# Patient Record
Sex: MALE | Race: WHITE | NOT HISPANIC OR LATINO | URBAN - METROPOLITAN AREA
[De-identification: names, ages, dates, MRNs, and addresses within clinical notes are randomized per-mention and may not be internally consistent; named-entity substitution may affect disease eponyms.]

---

## 2017-01-27 ENCOUNTER — EMERGENCY (EMERGENCY)
Facility: HOSPITAL | Age: 27
LOS: 0 days | Discharge: HOME | End: 2017-01-27
Admitting: EMERGENCY MEDICINE
Payer: MEDICAID

## 2017-01-27 DIAGNOSIS — S10.91XA ABRASION OF UNSPECIFIED PART OF NECK, INITIAL ENCOUNTER: ICD-10-CM

## 2017-01-27 DIAGNOSIS — S10.81XA ABRASION OF OTHER SPECIFIED PART OF NECK, INITIAL ENCOUNTER: ICD-10-CM
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
## 2017-01-27 PROCEDURE — 99283 EMERGENCY DEPT VISIT LOW MDM: CPT

## 2017-02-22 ENCOUNTER — EMERGENCY (EMERGENCY)
Facility: HOSPITAL | Age: 27
LOS: 0 days | Discharge: HOME | End: 2017-02-22
Attending: EMERGENCY MEDICINE | Admitting: EMERGENCY MEDICINE
Payer: MEDICAID

## 2017-02-22 VITALS
RESPIRATION RATE: 20 BRPM | HEART RATE: 85 BPM | SYSTOLIC BLOOD PRESSURE: 150 MMHG | OXYGEN SATURATION: 100 % | DIASTOLIC BLOOD PRESSURE: 65 MMHG

## 2017-02-22 VITALS
RESPIRATION RATE: 22 BRPM | DIASTOLIC BLOOD PRESSURE: 55 MMHG | TEMPERATURE: 98 F | SYSTOLIC BLOOD PRESSURE: 102 MMHG | HEART RATE: 90 BPM | OXYGEN SATURATION: 100 %

## 2017-02-22 DIAGNOSIS — R10.9 UNSPECIFIED ABDOMINAL PAIN: ICD-10-CM

## 2017-02-22 DIAGNOSIS — R11.2 NAUSEA WITH VOMITING, UNSPECIFIED: ICD-10-CM

## 2017-02-22 DIAGNOSIS — R10.13 EPIGASTRIC PAIN: ICD-10-CM

## 2017-02-22 LAB
ALBUMIN SERPL ELPH-MCNC: 4.4 G/DL — SIGNIFICANT CHANGE UP (ref 3.3–5)
ALP SERPL-CCNC: 88 U/L — SIGNIFICANT CHANGE UP (ref 40–120)
ALT FLD-CCNC: 43 U/L — SIGNIFICANT CHANGE UP (ref 12–78)
ANION GAP SERPL CALC-SCNC: 10 MMOL/L — SIGNIFICANT CHANGE UP (ref 5–17)
AST SERPL-CCNC: 26 U/L — SIGNIFICANT CHANGE UP (ref 15–37)
BASOPHILS # BLD AUTO: 0.1 K/UL — SIGNIFICANT CHANGE UP (ref 0–0.2)
BASOPHILS NFR BLD AUTO: 0.4 % — SIGNIFICANT CHANGE UP (ref 0–2)
BILIRUB SERPL-MCNC: 0.7 MG/DL — SIGNIFICANT CHANGE UP (ref 0.2–1.2)
BUN SERPL-MCNC: 11 MG/DL — SIGNIFICANT CHANGE UP (ref 7–23)
CALCIUM SERPL-MCNC: 9.6 MG/DL — SIGNIFICANT CHANGE UP (ref 8.5–10.1)
CHLORIDE SERPL-SCNC: 106 MMOL/L — SIGNIFICANT CHANGE UP (ref 96–108)
CO2 SERPL-SCNC: 25 MMOL/L — SIGNIFICANT CHANGE UP (ref 22–31)
CREAT SERPL-MCNC: 1.06 MG/DL — SIGNIFICANT CHANGE UP (ref 0.5–1.3)
EOSINOPHIL # BLD AUTO: 0.1 K/UL — SIGNIFICANT CHANGE UP (ref 0–0.5)
EOSINOPHIL NFR BLD AUTO: 0.5 % — SIGNIFICANT CHANGE UP (ref 0–6)
GLUCOSE SERPL-MCNC: 106 MG/DL — HIGH (ref 70–99)
HCT VFR BLD CALC: 44.2 % — SIGNIFICANT CHANGE UP (ref 39–50)
HGB BLD-MCNC: 15 G/DL — SIGNIFICANT CHANGE UP (ref 13–17)
LIDOCAIN IGE QN: 159 U/L — SIGNIFICANT CHANGE UP (ref 73–393)
LYMPHOCYTES # BLD AUTO: 1.4 K/UL — SIGNIFICANT CHANGE UP (ref 1–3.3)
LYMPHOCYTES # BLD AUTO: 12 % — LOW (ref 13–44)
MCHC RBC-ENTMCNC: 29.2 PG — SIGNIFICANT CHANGE UP (ref 27–34)
MCHC RBC-ENTMCNC: 33.9 GM/DL — SIGNIFICANT CHANGE UP (ref 32–36)
MCV RBC AUTO: 86 FL — SIGNIFICANT CHANGE UP (ref 80–100)
MONOCYTES # BLD AUTO: 0.7 K/UL — SIGNIFICANT CHANGE UP (ref 0–0.9)
MONOCYTES NFR BLD AUTO: 6 % — SIGNIFICANT CHANGE UP (ref 2–14)
NEUTROPHILS # BLD AUTO: 9.7 K/UL — HIGH (ref 1.8–7.4)
NEUTROPHILS NFR BLD AUTO: 81.1 % — HIGH (ref 43–77)
PLATELET # BLD AUTO: 234 K/UL — SIGNIFICANT CHANGE UP (ref 150–400)
POTASSIUM SERPL-MCNC: 3.4 MMOL/L — LOW (ref 3.5–5.3)
POTASSIUM SERPL-SCNC: 3.4 MMOL/L — LOW (ref 3.5–5.3)
PROT SERPL-MCNC: 7.8 GM/DL — SIGNIFICANT CHANGE UP (ref 6–8.3)
RBC # BLD: 5.14 M/UL — SIGNIFICANT CHANGE UP (ref 4.2–5.8)
RBC # FLD: 11.2 % — SIGNIFICANT CHANGE UP (ref 10.3–14.5)
SODIUM SERPL-SCNC: 141 MMOL/L — SIGNIFICANT CHANGE UP (ref 135–145)
WBC # BLD: 12 K/UL — HIGH (ref 3.8–10.5)
WBC # FLD AUTO: 12 K/UL — HIGH (ref 3.8–10.5)

## 2017-02-22 PROCEDURE — 99284 EMERGENCY DEPT VISIT MOD MDM: CPT

## 2017-02-22 RX ORDER — SODIUM CHLORIDE 9 MG/ML
2000 INJECTION INTRAMUSCULAR; INTRAVENOUS; SUBCUTANEOUS ONCE
Qty: 0 | Refills: 0 | Status: COMPLETED | OUTPATIENT
Start: 2017-02-22 | End: 2017-02-22

## 2017-02-22 RX ORDER — SODIUM CHLORIDE 9 MG/ML
3000 INJECTION INTRAMUSCULAR; INTRAVENOUS; SUBCUTANEOUS ONCE
Qty: 0 | Refills: 0 | Status: COMPLETED | OUTPATIENT
Start: 2017-02-22 | End: 2017-02-22

## 2017-02-22 RX ORDER — KETOROLAC TROMETHAMINE 30 MG/ML
30 SYRINGE (ML) INJECTION ONCE
Qty: 0 | Refills: 0 | Status: DISCONTINUED | OUTPATIENT
Start: 2017-02-22 | End: 2017-02-22

## 2017-02-22 RX ORDER — METOCLOPRAMIDE HCL 10 MG
10 TABLET ORAL ONCE
Qty: 0 | Refills: 0 | Status: COMPLETED | OUTPATIENT
Start: 2017-02-22 | End: 2017-02-22

## 2017-02-22 RX ORDER — METOCLOPRAMIDE HCL 10 MG
1 TABLET ORAL
Qty: 10 | Refills: 0 | OUTPATIENT
Start: 2017-02-22

## 2017-02-22 RX ORDER — FAMOTIDINE 10 MG/ML
20 INJECTION INTRAVENOUS ONCE
Qty: 0 | Refills: 0 | Status: COMPLETED | OUTPATIENT
Start: 2017-02-22 | End: 2017-02-22

## 2017-02-22 RX ORDER — SODIUM CHLORIDE 9 MG/ML
3 INJECTION INTRAMUSCULAR; INTRAVENOUS; SUBCUTANEOUS ONCE
Qty: 0 | Refills: 0 | Status: COMPLETED | OUTPATIENT
Start: 2017-02-22 | End: 2017-02-22

## 2017-02-22 RX ORDER — ONDANSETRON 8 MG/1
8 TABLET, FILM COATED ORAL ONCE
Qty: 0 | Refills: 0 | Status: COMPLETED | OUTPATIENT
Start: 2017-02-22 | End: 2017-02-22

## 2017-02-22 RX ADMIN — Medication 30 MILLIGRAM(S): at 12:25

## 2017-02-22 RX ADMIN — SODIUM CHLORIDE 3000 MILLILITER(S): 9 INJECTION INTRAMUSCULAR; INTRAVENOUS; SUBCUTANEOUS at 11:54

## 2017-02-22 RX ADMIN — ONDANSETRON 108 MILLIGRAM(S): 8 TABLET, FILM COATED ORAL at 15:25

## 2017-02-22 RX ADMIN — Medication 1 MILLIGRAM(S): at 11:55

## 2017-02-22 RX ADMIN — Medication 10 MILLIGRAM(S): at 11:54

## 2017-02-22 RX ADMIN — SODIUM CHLORIDE 3 MILLILITER(S): 9 INJECTION INTRAMUSCULAR; INTRAVENOUS; SUBCUTANEOUS at 11:54

## 2017-02-22 RX ADMIN — FAMOTIDINE 20 MILLIGRAM(S): 10 INJECTION INTRAVENOUS at 11:55

## 2017-02-22 RX ADMIN — SODIUM CHLORIDE 2000 MILLILITER(S): 9 INJECTION INTRAMUSCULAR; INTRAVENOUS; SUBCUTANEOUS at 15:25

## 2017-02-22 RX ADMIN — Medication 30 MILLIGRAM(S): at 11:55

## 2017-02-22 RX ADMIN — Medication 1 MILLIGRAM(S): at 15:25

## 2017-02-22 NOTE — ED ADULT NURSE REASSESSMENT NOTE - NS ED NURSE REASSESS COMMENT FT1
Pt states he feels "hopeless and can't take the cyclical vomitting that he has for the last 10 years." However, pt denies SI/HI . States feeling very anxious, medicated. Ellen PARISH NP made aware and stated she will speak with Dr. Clarke. Will continue to monitor.

## 2017-02-22 NOTE — ED STATDOCS - NS ED MD SCRIBE ATTENDING SCRIBE SECTIONS
DISPOSITION/PAST MEDICAL/SURGICAL/SOCIAL HISTORY/RESULTS/PROGRESS NOTE/REVIEW OF SYSTEMS/HISTORY OF PRESENT ILLNESS/PHYSICAL EXAM

## 2017-02-22 NOTE — ED STATDOCS - DETAILS:
Dr. Clarke: I performed the initial face to face bedside interview with this patient regarding history of present illness, review of symptoms and past medical, social and family history.  I completed an independent physical examination.  I was the initial provider who evaluated this patient.  The history, review of symptoms and examination was documented by the scribe in my presence and I attest to the accuracy of the documentation.  I have signed out the follow up of any pending tests (i.e. labs, radiological studies) to the ACP.  I have discussed the patient’s plan of care and disposition with the ACP.  Return to the ER immediately for any worsening symptoms, concerns, chest pain, fevers, shortness of breath, vomiting, abdominal pain, rashes, neck pain, back pain, numbness, paresthesias, pain or any difficulties at all.  Please follow up with your own private physician or our medical clinic at [phone] in the next 2-3 days.  Find a doctor at [phone].  Copies of your tests were provided to you for follow-up.  You must address all your findings with your doctor.

## 2017-02-22 NOTE — ED STATDOCS - PROGRESS NOTE DETAILS
27 yr. old male presents to ED with +nausea ,+ vomiting and abd. pain. Seen and examined by Dr. Clarke at Intake  PMH; Cyclic Vomiting,anxiety  Plan; IV Labs and Pepcid  MTaalfreda NP 27 yr. old male presents to ED with +nausea ,+ vomiting and abd. pain. Seen and examined by Dr. Clarke at Intake  PMH; Cyclic Vomiting, anxiety  Plan; IV Labs and Pepcid  Reglan and Ativan.  Elia NP  pt remained stable advised to follow up with gi. pt tolerated po

## 2017-02-22 NOTE — ED ADULT NURSE REASSESSMENT NOTE - NS ED NURSE REASSESS COMMENT FT1
Pt states he is to dizzy and still having nausea and can't go home wants to be admitted. Dr. Clarke made aware.

## 2017-02-22 NOTE — ED STATDOCS - OBJECTIVE STATEMENT
28 y/o male with hx of anxiety and cyclic vomiting syndrome presents to the ED c/o n/v and severe abd pain.

## 2017-02-22 NOTE — ED ADULT NURSE NOTE - OBJECTIVE STATEMENT
Pt states he woke up with mid-epi gastric pain with N/V. Abd soft non-distended with hypoactive bs in all 4 quads.

## 2017-02-22 NOTE — ED STATDOCS - CONDUCTED A DETAILED DISCUSSION WITH PATIENT AND/OR GUARDIAN REGARDING, MDM
lab results need for outpatient follow-up/return to ED if symptoms worsen, persist or questions arise/lab results

## 2017-05-12 ENCOUNTER — EMERGENCY (EMERGENCY)
Facility: HOSPITAL | Age: 27
LOS: 0 days | Discharge: HOME | End: 2017-05-12
Attending: EMERGENCY MEDICINE | Admitting: EMERGENCY MEDICINE
Payer: COMMERCIAL

## 2017-05-12 VITALS
DIASTOLIC BLOOD PRESSURE: 90 MMHG | WEIGHT: 190.04 LBS | HEART RATE: 95 BPM | HEIGHT: 69 IN | RESPIRATION RATE: 16 BRPM | OXYGEN SATURATION: 100 % | SYSTOLIC BLOOD PRESSURE: 134 MMHG

## 2017-05-12 VITALS
RESPIRATION RATE: 17 BRPM | HEART RATE: 87 BPM | DIASTOLIC BLOOD PRESSURE: 86 MMHG | SYSTOLIC BLOOD PRESSURE: 128 MMHG | OXYGEN SATURATION: 100 %
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
## 2017-05-12 DIAGNOSIS — S61.012A LACERATION WITHOUT FOREIGN BODY OF LEFT THUMB WITHOUT DAMAGE TO NAIL, INITIAL ENCOUNTER: ICD-10-CM

## 2017-05-12 DIAGNOSIS — S61.219A LACERATION WITHOUT FOREIGN BODY OF UNSPECIFIED FINGER WITHOUT DAMAGE TO NAIL, INITIAL ENCOUNTER: ICD-10-CM
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
## 2017-05-12 PROCEDURE — 73140 X-RAY EXAM OF FINGER(S): CPT | Mod: 26,LT

## 2017-05-12 PROCEDURE — 99283 EMERGENCY DEPT VISIT LOW MDM: CPT

## 2017-05-12 NOTE — ED PROVIDER NOTE - NS ED MD SCRIBE ATTENDING SCRIBE SECTIONS
PROGRESS NOTE/HISTORY OF PRESENT ILLNESS/RESULTS/PAST MEDICAL/SURGICAL/SOCIAL HISTORY/REVIEW OF SYSTEMS/DISPOSITION/PHYSICAL EXAM
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
## 2017-05-12 NOTE — ED ADULT NURSE NOTE - CHPI ED SYMPTOMS NEG
no bleeding at site/no drainage/no fever/no purulent drainage/no vomiting/no redness/no red streaks/no chills

## 2017-05-12 NOTE — PROGRESS NOTE ADULT - SUBJECTIVE AND OBJECTIVE BOX
Procedure (Suture of L thumb laceration): After verbal consent obtained the patient was injected with 10cc 2% Lido for digital block of left thumb. Patient themb was prepped in sterile fashion using betadyne. Four sutures using 5.0 cromic were placed, three on the ulnar side of the nailbed, one on the radial side. Patient thumb laceration was then covered with xeroform and placed in sterile soft dressing. Patient tolerated procedure well. Neurovascular exam unchanged. Patient is to keep dressing clean/dry, and is not to remove dressing until her follows up with Dr. Degroot in office within one week.

## 2017-05-12 NOTE — ED ADULT NURSE NOTE - ED STAT RN HANDOFF DETAILS
Received report from RN Jacque KIMBLE and reassessed patient. Agree with the previous RN assessment.

## 2017-05-12 NOTE — ED ADULT NURSE NOTE - OBJECTIVE STATEMENT
27 year old male presenting to the ED via EMS complaining of a laceration to his left dorsal thumb after "hitting it with an ax."  Patient reports he was chopping wood and "I hit my thumb." Patient has a 1 in laceration to the dorsum of his thumb at the nail bed. Patient has FROM/CMS. Patient has positive cap refill in the injured thumb. Patient reports that his pain is 10/10.

## 2017-05-12 NOTE — ED ADULT NURSE REASSESSMENT NOTE - NS ED NURSE REASSESS COMMENT FT1
2000: Patient reports that his pain is still 10/10 and that he "needs more medicine." MD Guerrero updated. No new orders at this time. Patient refused nonpharmacologic methods of pain control. Will continue to monitor/reassess.  2100: Ortho MD at bedside preforming wound closure and splint. Wound and splint care education preformed. Patient reports that his pain "is still 10/10 and I need a prescription to go home on." MD Guerrero updated, no new orders at this time. Will continue to monitor/reassess.

## 2017-05-12 NOTE — ED PROVIDER NOTE - MEDICAL DECISION MAKING DETAILS
laceration repaired by orthopedic surgeon Dr Degroot, discharge with f/u in office in one week for suture removal.

## 2017-05-12 NOTE — CONSULT NOTE ADULT - SUBJECTIVE AND OBJECTIVE BOX
28 YO M presents with complaint of left thumb pain after hitting himself in the hand with a hatchet. Tetanus shot given in ED. Denies numbness/tingling. Denies fevers/chills. Denies other injuries.    Vital Signs Last 24 Hrs  T(C): --  T(F): --  HR: 95 (95 - 95)  BP: 134/90 (134/90 - 134/90)  BP(mean): --  RR: 16 (16 - 16)  SpO2: 100% (100% - 100%)    XR L thumb: No fracture    PE  Gen: NAD  LUE:   1-2 cm superficial laceration extending across proximal 1st nailbed, minimal to no blood pooling under nail, nail well to nail bed, laceration extends just beyond radial and ulnar aspect of nail, fill thumb ROM, C5-T1 SILT, 2+ radial pulse, brisk cap refill    A/P 28 YO M with L thumb laceration  Pain control  Patient thumb laceration sutured with 5.0 cromic, 4 sutures placed around nailbed, placed in soft dressing  Keep dressing clean/dry  Ice/elevate  DC on Keflex x 3 days  FU with Dr. Degroot in office within 1 week  Ortho stable for DC

## 2017-05-12 NOTE — ED PROVIDER NOTE - CONSTITUTIONAL, MLM
normal... Awake, alert, oriented to person, place, time/situation. Moderate distress secondary to pain.
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
## 2017-06-04 ENCOUNTER — EMERGENCY (EMERGENCY)
Facility: HOSPITAL | Age: 27
LOS: 0 days | Discharge: HOME | End: 2017-06-04
Attending: EMERGENCY MEDICINE | Admitting: EMERGENCY MEDICINE
Payer: MEDICAID

## 2017-06-04 ENCOUNTER — EMERGENCY (EMERGENCY)
Facility: HOSPITAL | Age: 27
LOS: 0 days | Discharge: LEFT BEFORE DISCHARGE | End: 2017-06-04
Attending: EMERGENCY MEDICINE | Admitting: EMERGENCY MEDICINE
Payer: MEDICAID

## 2017-06-04 VITALS — WEIGHT: 184.97 LBS | HEIGHT: 72 IN

## 2017-06-04 VITALS
DIASTOLIC BLOOD PRESSURE: 90 MMHG | TEMPERATURE: 98 F | OXYGEN SATURATION: 98 % | HEART RATE: 96 BPM | HEIGHT: 72 IN | SYSTOLIC BLOOD PRESSURE: 141 MMHG | WEIGHT: 179.9 LBS | RESPIRATION RATE: 18 BRPM

## 2017-06-04 VITALS
OXYGEN SATURATION: 99 % | DIASTOLIC BLOOD PRESSURE: 95 MMHG | RESPIRATION RATE: 19 BRPM | SYSTOLIC BLOOD PRESSURE: 143 MMHG | HEART RATE: 93 BPM | TEMPERATURE: 98 F

## 2017-06-04 DIAGNOSIS — F11.23 OPIOID DEPENDENCE WITH WITHDRAWAL: ICD-10-CM

## 2017-06-04 DIAGNOSIS — F32.9 MAJOR DEPRESSIVE DISORDER, SINGLE EPISODE, UNSPECIFIED: ICD-10-CM

## 2017-06-04 DIAGNOSIS — F41.9 ANXIETY DISORDER, UNSPECIFIED: ICD-10-CM

## 2017-06-04 DIAGNOSIS — R69 ILLNESS, UNSPECIFIED: ICD-10-CM

## 2017-06-04 DIAGNOSIS — G43.A0 CYCLICAL VOMITING, IN MIGRAINE, NOT INTRACTABLE: ICD-10-CM

## 2017-06-04 DIAGNOSIS — Z79.899 OTHER LONG TERM (CURRENT) DRUG THERAPY: ICD-10-CM

## 2017-06-04 DIAGNOSIS — R45.851 SUICIDAL IDEATIONS: ICD-10-CM

## 2017-06-04 PROCEDURE — 99283 EMERGENCY DEPT VISIT LOW MDM: CPT | Mod: 25

## 2017-06-04 PROCEDURE — 99283 EMERGENCY DEPT VISIT LOW MDM: CPT

## 2017-06-04 RX ORDER — CLONAZEPAM 1 MG
1 TABLET ORAL
Qty: 10 | Refills: 0 | OUTPATIENT
Start: 2017-06-04 | End: 2017-06-14

## 2017-06-04 RX ADMIN — Medication 1 MILLIGRAM(S): at 14:15

## 2017-06-04 NOTE — ED STATDOCS - MEDICAL DECISION MAKING DETAILS
Pt has no SI/Hi, has situational anxiety, Plan to check ISTOP and possibly give anti anxiety and d/c for psychiatrist appointment in 2 weeks.

## 2017-06-04 NOTE — ED BEHAVIORAL HEALTH ASSESSMENT NOTE - SUMMARY
Patient is a 27 year old with multiple drug substance use disorder history, who recently withdraw from suboxone, resulting in GI complaints and insomnia. He reports taking seroquel 200 per night, has an appointment at the [facility] was wants to see a psychiatrist again.  Patient denies SI/HI he is impulsive, but states he will seek re-admission to N if suicidal.

## 2017-06-04 NOTE — ED BEHAVIORAL HEALTH ASSESSMENT NOTE - DIFFERENTIAL
substanced induced mood disorder, sedative/hypnotic use disorder, bipolar D.  opiate withdrawal syndrom

## 2017-06-04 NOTE — ED PROVIDER NOTE - NS ED MD SCRIBE ATTENDING SCRIBE SECTIONS
PROGRESS NOTE/RESULTS/HISTORY OF PRESENT ILLNESS/REVIEW OF SYSTEMS/DISPOSITION/VITAL SIGNS( Pullset)/PHYSICAL EXAM/PAST MEDICAL/SURGICAL/SOCIAL HISTORY

## 2017-06-04 NOTE — ED BEHAVIORAL HEALTH ASSESSMENT NOTE - SUICIDE PROTECTIVE FACTORS
Engaged in work or school/Identifies reasons for living/Future oriented/Responsibility to family and others

## 2017-06-04 NOTE — ED BEHAVIORAL HEALTH ASSESSMENT NOTE - HPI (INCLUDE ILLNESS QUALITY, SEVERITY, DURATION, TIMING, CONTEXT, MODIFYING FACTORS, ASSOCIATED SIGNS AND SYMPTOMS)
Patient has a history of polysubstance abuse, most recently on suboxone 8/2 2.5 day, last taken 5/19. Patient comes to ED with GI complaints which he states is all anxiety. Patient reports 5 days of very poor sleep. Patient requested xanax from the fast track MD, when told no he walked out, called his mother from the parking lot, she called the [employer] stating he was suicidal. The patient denies saying that, but he admits he was frustrated and said the hospital would not help him. The patient was on 5N for 6 days in Oct. 2016, he was admitted for drinking a large amount of wine, and taking ambien and xanax in overdose, after a breakup with his girlfriend. While on 5N was treated by Dr. Zambrano with seroquel up to 500mg at one point, seen as abusing alcohol and benzodiapines and having bipolar depression.  Patient was referred to [facility], which he stopped attending soon after going. He states he still takes seroquel 200mg at . Patient is still seeking klonopin at  until he can keep his appointment at the [facility] in one week. Patient denies HI/SI, he denies hallucinations or delusions.

## 2017-06-04 NOTE — ED PROVIDER NOTE - PROGRESS NOTE DETAILS
cleared by psychiatry w/ recommendation of clonidine for sleep.  no labs indicated at this time. cleared by psychiatry w/ recommendation of klonpine for sleep.  no labs indicated at this time. on repeat exam patient denies SI, HI, hallucinations.  will fu with psych as outpatient. informed that klonopine is addictive and only take it as needed. patient verbalized understanding, agrees w/ outpatient plans. safe discharge plan home. cleared by psychiatry w/ recommendation of gary for sleep.  no labs indicated at this time.

## 2017-06-04 NOTE — ED ADULT TRIAGE NOTE - CHIEF COMPLAINT QUOTE
c/o increase anxiety and depression, denies suicidal and homicidal ideation, pt states he has been vomiting for past 5 days and unable to sleep. unable to keep seroquel medication down. pt states he has made an appointment with psychiatrist in 2 weeks.

## 2017-06-04 NOTE — ED STATDOCS - PROGRESS NOTE DETAILS
CALEB Bernal: Patient has been seen, evaluated and orders have been written by the attending in intake. Patient is stable.  I will follow up the results of orders written and I will continue to evaluate/observe the patient. discussed with dr hirsch, after reviewing pt ISTOP, on suboxone and can't give xanax he requested. spoke with pt, upset that we won't prescribed xanax. NP Lino in ED to see pt, but pt eloped. pt wasn't SI/HI. Dr. HIRSCH. aware pt eloped

## 2017-06-04 NOTE — ED BEHAVIORAL HEALTH ASSESSMENT NOTE - DETAILS
drank wine, took OD of ambien and xanax in Oct. 2016 after break up with his girlfriend more likely from xanax abuse father depression Dr. Gatica, Dr. Gay and RNs

## 2017-06-04 NOTE — ED PROVIDER NOTE - DETAILS:
I Rodolfo Gatica MD saw and examined this patient. Scribe documented for me and under my supervision. Resident physician saw, and examined the patient and discussed his care for the patient with me and I supervised his care and assessment and agree with it. I have modified the documentation in the chart (resident or scribe) where necessary to reflect my history, physical exam findings, or other documentation relevant to the care of this patient.

## 2017-06-04 NOTE — ED BEHAVIORAL HEALTH ASSESSMENT NOTE - DESCRIPTION
Patient was very anxious, and refused blood work (because he is afraid of having needles).  Patient was cooperative with the interview. vomiting, asthma, seizure from BZD w/D single, never

## 2017-06-04 NOTE — ED PROVIDER NOTE - OBJECTIVE STATEMENT
26 y/o male with PMHx of anxiety and depression. Pt came to [facility] ED earlier today c/o anxiety for a few days and wanted to get xanax, he was denied the Xanax and d/c. He called his mom in the parking lot of the hospital, frustrated that he wasn't getting the medication he needed and wanted to kill himself. Mother then called the police. Pt states he hasn't slept or ate 5 days. Denies SI/HI, active hallucinations and delusions, CP, SOB. Pt is requesting a Xanax rx. Has appt with psychiatrist next week. 26 y/o male with PMHx of anxiety and depression. Pt came to [place] ED earlier today c/o anxiety for a few days and wanted to get xanax, he was denied the Xanax and d/c. He called his mom in the parking lot of the hospital, frustrated that he wasn't getting the medication he needed and wanted to kill himself. Mother then called the police for possible SI. Pt states he hasn't slept or ate 5 days. Denies SI/HI, active hallucinations and delusions, CP, SOB. Pt is requesting a Xanax rx. Has appt with psychiatrist next week. 28 y/o male with PMHx of anxiety and depression. Pt came to [place] ED earlier today c/o anxiety for a few days and wanted to get xanax, he was denied the Xanax and d/c. He called his mom in the parking lot of the hospital, frustrated that he wasn't getting the medication he needed and wanted to kill himself. Mother then called the police for possible SI. Pt states he hasn't slept or ate 5 days. Denies SI/HI, active hallucinations and delusions, CP, SOB. Pt is requesting a Xanax rx. Has appt with psychiatrist next week.  Patient denies any coingestions.  Denies taking tylenol or salicyates 26 y/o male with PMHx of anxiety and depression BIB PD for possible SI. Pt came to [facility] ED earlier today c/o anxiety for a few days and wanted to get xanax, he was denied the Xanax and d/c'ed. He called his mom in the parking lot of the hospital, frustrated that he wasn't getting the medication he needed and as patient's mom then worried that patient wanted to kill himself. Mother then called the police for possible SI. Pt states he hasn't slept well for the last 5 days. Denies SI/HI, active hallucinations and delusions, CP, SOB. Pt is requesting a Xanax rx. Has appt with psychiatrist next week.  Patient denies any coingestions.  Denies taking tylenol or salicyates

## 2017-06-04 NOTE — ED STATDOCS - NS ED MD SCRIBE ATTENDING SCRIBE SECTIONS
PHYSICAL EXAM/PAST MEDICAL/SURGICAL/SOCIAL HISTORY/DISPOSITION/RESULTS/REVIEW OF SYSTEMS/VITAL SIGNS( Pullset)/HISTORY OF PRESENT ILLNESS/PROGRESS NOTE

## 2017-06-04 NOTE — ED STATDOCS - ATTENDING CONTRIBUTION TO CARE
I, Jessie Trotter, performed the initial face to face bedside interview with this patient regarding history of present illness, review of symptoms and relevant past medical, social and family history.  I completed an independent physical examination.  I was the initial provider who evaluated this patient. I have signed out the follow up of any pending tests (i.e. labs, radiological studies) to the ACP with instructions to review any pertinent findings to me prior to determining a final disposition.  I have communicated the patient’s plan of care and disposition with the ACP.  The history, relevant review of systems, past medical and surgical history, medical decision making, and physical examination was documented by the scribe in my presence and I attest to the accuracy of the documentation.

## 2017-06-04 NOTE — ED BEHAVIORAL HEALTH ASSESSMENT NOTE - DESCRIPTION (FIRST USE, LAST USE, QUANTITY, FREQUENCY, DURATION)
history of alcohol use disorder admits to history of heavy THC use admits to being on suboxone for 3 months provided by Salvatore Zhou history of xanax abuse

## 2017-06-04 NOTE — ED STATDOCS - OBJECTIVE STATEMENT
26 y/o M with Hx of of depression being treated with seroquil, and gabapentin, suicidality hospitalization presents to ED for evaluation of anxiety and panic attacks. Pt states he is under a lot of stress due to a new job and looking for a new place to live and has been having anxiety over this. Pt denies HI/SI, depressed feeling. Pt states he has an appointment with his psychiatrist in 2 weeks..

## 2017-06-04 NOTE — ED PROVIDER NOTE - MEDICAL DECISION MAKING DETAILS
Will obtain labs and psych consult. 28 yo male w/ anxiety and depression.  Pt denies SI or HI.  Patient not hallucinating.  Psych eval

## 2017-06-05 ENCOUNTER — LABORATORY RESULT (OUTPATIENT)
Age: 27
End: 2017-06-05

## 2017-06-05 ENCOUNTER — APPOINTMENT (OUTPATIENT)
Dept: INTERNAL MEDICINE | Facility: CLINIC | Age: 27
End: 2017-06-05

## 2017-06-05 ENCOUNTER — EMERGENCY (EMERGENCY)
Facility: HOSPITAL | Age: 27
LOS: 0 days | Discharge: HOME | End: 2017-06-05
Attending: EMERGENCY MEDICINE | Admitting: EMERGENCY MEDICINE
Payer: MEDICAID

## 2017-06-05 VITALS
HEIGHT: 72 IN | DIASTOLIC BLOOD PRESSURE: 89 MMHG | TEMPERATURE: 99 F | RESPIRATION RATE: 16 BRPM | WEIGHT: 184.97 LBS | OXYGEN SATURATION: 97 % | SYSTOLIC BLOOD PRESSURE: 134 MMHG | HEART RATE: 77 BPM

## 2017-06-05 VITALS
HEART RATE: 75 BPM | SYSTOLIC BLOOD PRESSURE: 139 MMHG | RESPIRATION RATE: 18 BRPM | OXYGEN SATURATION: 96 % | DIASTOLIC BLOOD PRESSURE: 91 MMHG

## 2017-06-05 DIAGNOSIS — F41.9 ANXIETY DISORDER, UNSPECIFIED: ICD-10-CM

## 2017-06-05 DIAGNOSIS — F11.10 OPIOID ABUSE, UNCOMPLICATED: ICD-10-CM

## 2017-06-05 DIAGNOSIS — G43.A0 CYCLICAL VOMITING, IN MIGRAINE, NOT INTRACTABLE: ICD-10-CM

## 2017-06-05 DIAGNOSIS — Z79.899 OTHER LONG TERM (CURRENT) DRUG THERAPY: ICD-10-CM

## 2017-06-05 DIAGNOSIS — F11.93 OPIOID USE, UNSPECIFIED WITH WITHDRAWAL: ICD-10-CM

## 2017-06-05 DIAGNOSIS — F32.9 MAJOR DEPRESSIVE DISORDER, SINGLE EPISODE, UNSPECIFIED: ICD-10-CM

## 2017-06-05 LAB
ALBUMIN SERPL ELPH-MCNC: 4.3 G/DL — SIGNIFICANT CHANGE UP (ref 3.3–5)
ALP SERPL-CCNC: 105 U/L — SIGNIFICANT CHANGE UP (ref 40–120)
ALT FLD-CCNC: 131 U/L — HIGH (ref 12–78)
ANION GAP SERPL CALC-SCNC: 7 MMOL/L — SIGNIFICANT CHANGE UP (ref 5–17)
APAP SERPL-MCNC: <2 UG/ML — LOW (ref 10–30)
AST SERPL-CCNC: 43 U/L — HIGH (ref 15–37)
BASOPHILS # BLD AUTO: 0.1 K/UL — SIGNIFICANT CHANGE UP (ref 0–0.2)
BASOPHILS NFR BLD AUTO: 1 % — SIGNIFICANT CHANGE UP (ref 0–2)
BILIRUB SERPL-MCNC: 0.8 MG/DL — SIGNIFICANT CHANGE UP (ref 0.2–1.2)
BUN SERPL-MCNC: 9 MG/DL — SIGNIFICANT CHANGE UP (ref 7–23)
CALCIUM SERPL-MCNC: 9.2 MG/DL — SIGNIFICANT CHANGE UP (ref 8.5–10.1)
CHLORIDE SERPL-SCNC: 105 MMOL/L — SIGNIFICANT CHANGE UP (ref 96–108)
CO2 SERPL-SCNC: 26 MMOL/L — SIGNIFICANT CHANGE UP (ref 22–31)
CREAT SERPL-MCNC: 0.98 MG/DL — SIGNIFICANT CHANGE UP (ref 0.5–1.3)
EOSINOPHIL # BLD AUTO: 0 K/UL — SIGNIFICANT CHANGE UP (ref 0–0.5)
EOSINOPHIL NFR BLD AUTO: 0.1 % — SIGNIFICANT CHANGE UP (ref 0–6)
ETHANOL SERPL-MCNC: <10 MG/DL — SIGNIFICANT CHANGE UP (ref 0–10)
GLUCOSE SERPL-MCNC: 101 MG/DL — HIGH (ref 70–99)
HCT VFR BLD CALC: 41.7 % — SIGNIFICANT CHANGE UP (ref 39–50)
HGB BLD-MCNC: 14.5 G/DL — SIGNIFICANT CHANGE UP (ref 13–17)
LYMPHOCYTES # BLD AUTO: 1.8 K/UL — SIGNIFICANT CHANGE UP (ref 1–3.3)
LYMPHOCYTES # BLD AUTO: 17.1 % — SIGNIFICANT CHANGE UP (ref 13–44)
MCHC RBC-ENTMCNC: 30.4 PG — SIGNIFICANT CHANGE UP (ref 27–34)
MCHC RBC-ENTMCNC: 34.8 GM/DL — SIGNIFICANT CHANGE UP (ref 32–36)
MCV RBC AUTO: 87.4 FL — SIGNIFICANT CHANGE UP (ref 80–100)
MONOCYTES # BLD AUTO: 0.9 K/UL — SIGNIFICANT CHANGE UP (ref 0–0.9)
MONOCYTES NFR BLD AUTO: 8 % — SIGNIFICANT CHANGE UP (ref 2–14)
NEUTROPHILS # BLD AUTO: 7.9 K/UL — HIGH (ref 1.8–7.4)
NEUTROPHILS NFR BLD AUTO: 73.9 % — SIGNIFICANT CHANGE UP (ref 43–77)
PLATELET # BLD AUTO: 262 K/UL — SIGNIFICANT CHANGE UP (ref 150–400)
POTASSIUM SERPL-MCNC: 3.8 MMOL/L — SIGNIFICANT CHANGE UP (ref 3.5–5.3)
POTASSIUM SERPL-SCNC: 3.8 MMOL/L — SIGNIFICANT CHANGE UP (ref 3.5–5.3)
PROT SERPL-MCNC: 7.7 GM/DL — SIGNIFICANT CHANGE UP (ref 6–8.3)
RBC # BLD: 4.77 M/UL — SIGNIFICANT CHANGE UP (ref 4.2–5.8)
RBC # FLD: 12.5 % — SIGNIFICANT CHANGE UP (ref 10.3–14.5)
SALICYLATES SERPL-MCNC: <1.7 MG/DL — LOW (ref 2.8–20)
SODIUM SERPL-SCNC: 138 MMOL/L — SIGNIFICANT CHANGE UP (ref 135–145)
WBC # BLD: 10.7 K/UL — HIGH (ref 3.8–10.5)
WBC # FLD AUTO: 10.7 K/UL — HIGH (ref 3.8–10.5)

## 2017-06-05 PROCEDURE — 99284 EMERGENCY DEPT VISIT MOD MDM: CPT

## 2017-06-05 RX ORDER — KETOROLAC TROMETHAMINE 30 MG/ML
30 SYRINGE (ML) INJECTION ONCE
Qty: 0 | Refills: 0 | Status: DISCONTINUED | OUTPATIENT
Start: 2017-06-05 | End: 2017-06-05

## 2017-06-05 RX ORDER — ONDANSETRON 8 MG/1
4 TABLET, FILM COATED ORAL ONCE
Qty: 0 | Refills: 0 | Status: COMPLETED | OUTPATIENT
Start: 2017-06-05 | End: 2017-06-05

## 2017-06-05 RX ORDER — GABAPENTIN 400 MG/1
0 CAPSULE ORAL
Qty: 0 | Refills: 0 | COMMUNITY

## 2017-06-05 RX ORDER — SODIUM CHLORIDE 9 MG/ML
1000 INJECTION INTRAMUSCULAR; INTRAVENOUS; SUBCUTANEOUS ONCE
Qty: 0 | Refills: 0 | Status: DISCONTINUED | OUTPATIENT
Start: 2017-06-05 | End: 2017-06-05

## 2017-06-05 RX ORDER — FLUOXETINE HCL 10 MG
1 CAPSULE ORAL
Qty: 0 | Refills: 0 | COMMUNITY

## 2017-06-05 RX ORDER — SODIUM CHLORIDE 9 MG/ML
1000 INJECTION INTRAMUSCULAR; INTRAVENOUS; SUBCUTANEOUS ONCE
Qty: 0 | Refills: 0 | Status: COMPLETED | OUTPATIENT
Start: 2017-06-05 | End: 2017-06-05

## 2017-06-05 RX ADMIN — SODIUM CHLORIDE 1000 MILLILITER(S): 9 INJECTION INTRAMUSCULAR; INTRAVENOUS; SUBCUTANEOUS at 10:00

## 2017-06-05 RX ADMIN — ONDANSETRON 4 MILLIGRAM(S): 8 TABLET, FILM COATED ORAL at 09:59

## 2017-06-05 RX ADMIN — Medication 30 MILLIGRAM(S): at 09:59

## 2017-06-05 RX ADMIN — Medication 0.1 MILLIGRAM(S): at 10:21

## 2017-06-05 NOTE — ED PROVIDER NOTE - OBJECTIVE STATEMENT
28 yo male with hx drug/heroin abuse c/o feeling of withdrawal. c/o body pain. states last used heroin x 1 week ago. denies fever or chills. +nausea. denies vomiting or diarrhea. +cough. denies fever or chills.

## 2017-06-05 NOTE — ED ADULT NURSE REASSESSMENT NOTE - NS ED NURSE REASSESS COMMENT FT1
Pt's father Carlos Chavez called to state that he feels pt is danger to himself.  Pt denies suicidal ideation to me and MD.  MD aware of call.
pt complains of generalized pain and abd pain, states he is withdrawing from heroin. pt requesting "something iv" for his symptoms. pt medicated as ordered. alert and oriented x4, resp even and unlabored. pt requesting to speak with dr liu, dr liu now at bedside.

## 2017-06-05 NOTE — SBIRT NOTE. - NSSBIRTSERVICES_GEN_A_ED_FT
Provided SBIRT services: Full screen positive. Referral to Treatment Performed. Screening results were reviewed with the patient and patient was provided information about healthy guidelines and potential negative consequences associated with level of risk. Motivation and readiness to reduce or stop use was discussed and goals and activities to make changes were suggested/offered.    Referral for complete assessment and level of care determination at a certified treatment facility was completed by contacting the treatment facility via phone, [facility], waiting for confirmation of a bed; same day admission requested.    Audit Score: 10  DAST Score:9 Provided SBIRT services: Full screen positive. Referral to Treatment Performed. Screening results were reviewed with the patient and patient was provided information about healthy guidelines and potential negative consequences associated with level of risk. Motivation and readiness to reduce or stop use was discussed and goals and activities to make changes were suggested/offered.    Referral for complete assessment and level of care determination at a certified treatment facility was completed by contacting the treatment facility via phone, [facility], waiting for confirmation of a bed; same day admission requested.    Audit Score: 10  DAST Score:9    Naloxone Rescue Kit dispensed: Pt was educated about Naloxone and trained on how to assemble and utilize the kit.

## 2017-06-05 NOTE — ED ADULT NURSE NOTE - OBJECTIVE STATEMENT
Pt reports discontinuing heroin use one week ago, wants assistance with withdrawal and addiction.  Complains of abdominal pain.

## 2017-06-08 LAB — SUBOXONE: NEGATIVE

## 2017-06-12 LAB
AMPHET UR-MCNC: NEGATIVE
BARBITURATES UR-MCNC: NEGATIVE
BENZODIAZ UR-MCNC: NORMAL
COCAINE METAB.OTHER UR-MCNC: NEGATIVE
CREATININE, URINE: >200 MG/DL
METHADONE UR-MCNC: NEGATIVE
METHAQUALONE UR-MCNC: NEGATIVE
OPIATES UR-MCNC: NEGATIVE
PCP UR-MCNC: NEGATIVE
PROPOXYPH UR QL: NEGATIVE
THC UR QL: NORMAL

## 2017-07-03 ENCOUNTER — APPOINTMENT (OUTPATIENT)
Dept: INTERNAL MEDICINE | Facility: CLINIC | Age: 27
End: 2017-07-03

## 2017-08-08 ENCOUNTER — APPOINTMENT (OUTPATIENT)
Dept: INTERNAL MEDICINE | Facility: CLINIC | Age: 27
End: 2017-08-08

## 2017-08-11 ENCOUNTER — APPOINTMENT (OUTPATIENT)
Dept: INTERNAL MEDICINE | Facility: CLINIC | Age: 27
End: 2017-08-11
Payer: SELF-PAY

## 2017-08-11 ENCOUNTER — LABORATORY RESULT (OUTPATIENT)
Age: 27
End: 2017-08-11

## 2017-08-11 PROCEDURE — 99499D: CUSTOM

## 2017-08-18 LAB — SUBOXONE: NORMAL

## 2017-08-21 LAB
AMPHET UR-MCNC: NEGATIVE
BARBITURATES UR-MCNC: NEGATIVE
BENZODIAZ UR-MCNC: NEGATIVE
COCAINE METAB.OTHER UR-MCNC: NEGATIVE
CREATININE, URINE: >200 MG/DL
METHADONE UR-MCNC: NEGATIVE
METHAQUALONE UR-MCNC: NEGATIVE
OPIATES UR-MCNC: NEGATIVE
PCP UR-MCNC: NEGATIVE
PROPOXYPH UR QL: NEGATIVE
THC UR QL: NORMAL

## 2017-08-22 ENCOUNTER — MEDICATION RENEWAL (OUTPATIENT)
Age: 27
End: 2017-08-22

## 2017-08-31 ENCOUNTER — EMERGENCY (EMERGENCY)
Facility: HOSPITAL | Age: 27
LOS: 0 days | Discharge: HOME | End: 2017-09-01
Attending: EMERGENCY MEDICINE | Admitting: EMERGENCY MEDICINE
Payer: MEDICAID

## 2017-08-31 VITALS
SYSTOLIC BLOOD PRESSURE: 125 MMHG | HEART RATE: 77 BPM | RESPIRATION RATE: 18 BRPM | HEIGHT: 72 IN | OXYGEN SATURATION: 100 % | DIASTOLIC BLOOD PRESSURE: 79 MMHG | TEMPERATURE: 99 F | WEIGHT: 179.9 LBS

## 2017-08-31 DIAGNOSIS — S09.90XA UNSPECIFIED INJURY OF HEAD, INITIAL ENCOUNTER: ICD-10-CM

## 2017-08-31 DIAGNOSIS — F41.9 ANXIETY DISORDER, UNSPECIFIED: ICD-10-CM

## 2017-08-31 DIAGNOSIS — Z79.899 OTHER LONG TERM (CURRENT) DRUG THERAPY: ICD-10-CM

## 2017-08-31 DIAGNOSIS — F32.9 MAJOR DEPRESSIVE DISORDER, SINGLE EPISODE, UNSPECIFIED: ICD-10-CM

## 2017-08-31 DIAGNOSIS — F43.24 ADJUSTMENT DISORDER WITH DISTURBANCE OF CONDUCT: ICD-10-CM

## 2017-08-31 LAB
ALBUMIN SERPL ELPH-MCNC: 4.4 G/DL — SIGNIFICANT CHANGE UP (ref 3.3–5)
ALP SERPL-CCNC: 100 U/L — SIGNIFICANT CHANGE UP (ref 40–120)
ALT FLD-CCNC: 50 U/L — SIGNIFICANT CHANGE UP (ref 12–78)
ANION GAP SERPL CALC-SCNC: 9 MMOL/L — SIGNIFICANT CHANGE UP (ref 5–17)
APAP SERPL-MCNC: <2 UG/ML — LOW (ref 10–30)
AST SERPL-CCNC: 27 U/L — SIGNIFICANT CHANGE UP (ref 15–37)
BASOPHILS # BLD AUTO: 0 K/UL — SIGNIFICANT CHANGE UP (ref 0–0.2)
BASOPHILS NFR BLD AUTO: 0.4 % — SIGNIFICANT CHANGE UP (ref 0–2)
BILIRUB SERPL-MCNC: 1.1 MG/DL — SIGNIFICANT CHANGE UP (ref 0.2–1.2)
BUN SERPL-MCNC: 14 MG/DL — SIGNIFICANT CHANGE UP (ref 7–23)
CALCIUM SERPL-MCNC: 9.3 MG/DL — SIGNIFICANT CHANGE UP (ref 8.5–10.1)
CHLORIDE SERPL-SCNC: 104 MMOL/L — SIGNIFICANT CHANGE UP (ref 96–108)
CO2 SERPL-SCNC: 27 MMOL/L — SIGNIFICANT CHANGE UP (ref 22–31)
CREAT SERPL-MCNC: 1.38 MG/DL — HIGH (ref 0.5–1.3)
EOSINOPHIL # BLD AUTO: 0.4 K/UL — SIGNIFICANT CHANGE UP (ref 0–0.5)
EOSINOPHIL NFR BLD AUTO: 5.3 % — SIGNIFICANT CHANGE UP (ref 0–6)
ETHANOL SERPL-MCNC: <10 MG/DL — SIGNIFICANT CHANGE UP (ref 0–10)
GLUCOSE SERPL-MCNC: 92 MG/DL — SIGNIFICANT CHANGE UP (ref 70–99)
HCT VFR BLD CALC: 39.6 % — SIGNIFICANT CHANGE UP (ref 39–50)
HGB BLD-MCNC: 14.2 G/DL — SIGNIFICANT CHANGE UP (ref 13–17)
LYMPHOCYTES # BLD AUTO: 1.3 K/UL — SIGNIFICANT CHANGE UP (ref 1–3.3)
LYMPHOCYTES # BLD AUTO: 14.7 % — SIGNIFICANT CHANGE UP (ref 13–44)
MCHC RBC-ENTMCNC: 30.2 PG — SIGNIFICANT CHANGE UP (ref 27–34)
MCHC RBC-ENTMCNC: 35.9 GM/DL — SIGNIFICANT CHANGE UP (ref 32–36)
MCV RBC AUTO: 84.1 FL — SIGNIFICANT CHANGE UP (ref 80–100)
MONOCYTES # BLD AUTO: 0.9 K/UL — SIGNIFICANT CHANGE UP (ref 0–0.9)
MONOCYTES NFR BLD AUTO: 10.7 % — SIGNIFICANT CHANGE UP (ref 2–14)
NEUTROPHILS # BLD AUTO: 5.9 K/UL — SIGNIFICANT CHANGE UP (ref 1.8–7.4)
NEUTROPHILS NFR BLD AUTO: 68.8 % — SIGNIFICANT CHANGE UP (ref 43–77)
PLATELET # BLD AUTO: 212 K/UL — SIGNIFICANT CHANGE UP (ref 150–400)
POTASSIUM SERPL-MCNC: 3.7 MMOL/L — SIGNIFICANT CHANGE UP (ref 3.5–5.3)
POTASSIUM SERPL-SCNC: 3.7 MMOL/L — SIGNIFICANT CHANGE UP (ref 3.5–5.3)
PROT SERPL-MCNC: 7.5 GM/DL — SIGNIFICANT CHANGE UP (ref 6–8.3)
RBC # BLD: 4.71 M/UL — SIGNIFICANT CHANGE UP (ref 4.2–5.8)
RBC # FLD: 11.8 % — SIGNIFICANT CHANGE UP (ref 10.3–14.5)
SALICYLATES SERPL-MCNC: 2.1 MG/DL — LOW (ref 2.8–20)
SODIUM SERPL-SCNC: 140 MMOL/L — SIGNIFICANT CHANGE UP (ref 135–145)
WBC # BLD: 8.5 K/UL — SIGNIFICANT CHANGE UP (ref 3.8–10.5)
WBC # FLD AUTO: 8.5 K/UL — SIGNIFICANT CHANGE UP (ref 3.8–10.5)

## 2017-08-31 PROCEDURE — 99284 EMERGENCY DEPT VISIT MOD MDM: CPT | Mod: 25

## 2017-08-31 PROCEDURE — 93010 ELECTROCARDIOGRAM REPORT: CPT

## 2017-08-31 PROCEDURE — 71020: CPT | Mod: 26

## 2017-08-31 PROCEDURE — 70450 CT HEAD/BRAIN W/O DYE: CPT | Mod: 26

## 2017-08-31 PROCEDURE — 76377 3D RENDER W/INTRP POSTPROCES: CPT | Mod: 26

## 2017-08-31 PROCEDURE — 70486 CT MAXILLOFACIAL W/O DYE: CPT | Mod: 26

## 2017-08-31 PROCEDURE — 72125 CT NECK SPINE W/O DYE: CPT | Mod: 26

## 2017-08-31 RX ORDER — IBUPROFEN 200 MG
600 TABLET ORAL ONCE
Qty: 0 | Refills: 0 | Status: COMPLETED | OUTPATIENT
Start: 2017-08-31 | End: 2017-08-31

## 2017-08-31 RX ORDER — ACETAMINOPHEN 500 MG
650 TABLET ORAL ONCE
Qty: 0 | Refills: 0 | Status: COMPLETED | OUTPATIENT
Start: 2017-08-31 | End: 2017-08-31

## 2017-08-31 RX ADMIN — Medication 650 MILLIGRAM(S): at 21:53
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
## 2017-08-31 NOTE — ED PROVIDER NOTE - ENMT, MLM
Airway patent, Nasal mucosa clear. Mouth with normal mucosa, some dried blood on dry lips. Throat has no vesicles, no oropharyngeal exudates and uvula is midline.
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
## 2017-09-01 VITALS
TEMPERATURE: 98 F | DIASTOLIC BLOOD PRESSURE: 70 MMHG | OXYGEN SATURATION: 100 % | RESPIRATION RATE: 16 BRPM | HEART RATE: 88 BPM | SYSTOLIC BLOOD PRESSURE: 125 MMHG

## 2017-09-01 DIAGNOSIS — R69 ILLNESS, UNSPECIFIED: ICD-10-CM

## 2017-09-01 DIAGNOSIS — F43.24 ADJUSTMENT DISORDER WITH DISTURBANCE OF CONDUCT: ICD-10-CM

## 2017-09-01 LAB
AMPHET UR-MCNC: NEGATIVE — SIGNIFICANT CHANGE UP
APPEARANCE UR: CLEAR — SIGNIFICANT CHANGE UP
BACTERIA # UR AUTO: (no result)
BARBITURATES UR SCN-MCNC: NEGATIVE — SIGNIFICANT CHANGE UP
BENZODIAZ UR-MCNC: POSITIVE — SIGNIFICANT CHANGE UP
BILIRUB UR-MCNC: (no result)
COCAINE METAB.OTHER UR-MCNC: NEGATIVE — SIGNIFICANT CHANGE UP
COLOR SPEC: YELLOW — SIGNIFICANT CHANGE UP
COMMENT - URINE: SIGNIFICANT CHANGE UP
DIFF PNL FLD: (no result)
EPI CELLS # UR: SIGNIFICANT CHANGE UP
GLUCOSE UR QL: NEGATIVE MG/DL — SIGNIFICANT CHANGE UP
KETONES UR-MCNC: (no result)
LEUKOCYTE ESTERASE UR-ACNC: (no result)
METHADONE UR-MCNC: NEGATIVE — SIGNIFICANT CHANGE UP
NITRITE UR-MCNC: NEGATIVE — SIGNIFICANT CHANGE UP
OPIATES UR-MCNC: NEGATIVE — SIGNIFICANT CHANGE UP
PCP SPEC-MCNC: SIGNIFICANT CHANGE UP
PCP UR-MCNC: NEGATIVE — SIGNIFICANT CHANGE UP
PH UR: 6 — SIGNIFICANT CHANGE UP (ref 5–8)
PROT UR-MCNC: 30 MG/DL
RBC CASTS # UR COMP ASSIST: SIGNIFICANT CHANGE UP /HPF (ref 0–4)
SP GR SPEC: 1.01 — SIGNIFICANT CHANGE UP (ref 1.01–1.02)
THC UR QL: POSITIVE — SIGNIFICANT CHANGE UP
UROBILINOGEN FLD QL: 4 MG/DL
WBC UR QL: SIGNIFICANT CHANGE UP

## 2017-09-01 RX ADMIN — Medication 600 MILLIGRAM(S): at 00:14
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
## 2017-09-01 NOTE — ED BEHAVIORAL HEALTH ASSESSMENT NOTE - OTHER PAST PSYCHIATRIC HISTORY (INCLUDE DETAILS REGARDING ONSET, COURSE OF ILLNESS, INPATIENT/OUTPATIENT TREATMENT)
Pt has histroy of at least one prior psychiatric admission, both Pt and mother were unable to clarify number of admissions. Pt has no known history of suicide attempts, denies history of psychosis. Pt history of one prior rehab admission, unable to recall date/ name of rehab and [facility] Per Pt and mother Pt [facility] as [facility] felt Pt would benefit from mental health treatment. Pt currently sees Dr. Titus for suboxone. Pt has history of at least one prior psychiatric admission, both Pt and mother were unable to clarify number of admissions. Pt has no known history of suicide attempts, denies history of psychosis. Pt history of one prior rehab admission, unable to recall date/ name of rehab and history of outpatient treatment at [facility] until 3 weeks ago. Per Pt and mother Pt no longer able to receive treatment at [facility] as they felt Pt would benefit from mental health treatment. Pt currently sees Dr. Titus for suboxone.

## 2017-09-01 NOTE — ED BEHAVIORAL HEALTH ASSESSMENT NOTE - DETAILS
Pt has history of SI, no known history of suicide attempts. father has history of depression and suicide attempt Pt reports he discovered father after a suicide attempt as an adolescent. Pt reports "I have PTSD from finding him like that." n/a
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
## 2017-09-01 NOTE — ED BEHAVIORAL HEALTH ASSESSMENT NOTE - RISK ASSESSMENT
Although patient has risk factors of prior hospitalizations, positive family hx, hx of substance abuse, and impulsivity, he has protective factors of  no hx of prior suicide attempts, no self- injurious behavior,  no access to firearms, no hx of abuse, he denies current mood or psychotic symptoms, denies si/hi/avh. Patient is currently low risk of danger to himself or others and does not require inpatient psychiatric hospitalization.

## 2017-09-01 NOTE — ED BEHAVIORAL HEALTH ASSESSMENT NOTE - DESCRIPTION
Pt noted to be agitated in ED, did not require PRN medications. Pt resides with roommates in an apartment in [place], Pt has family(mother, father and brother) who live close by and Pt reports he has a positive relationship with. Pt has associates degree currently works full time at a computer store. asthma

## 2017-09-01 NOTE — ED BEHAVIORAL HEALTH ASSESSMENT NOTE - SAFETY PLAN DETAILS
Patient aware to return to ER with any worsening depression, anxiety, suicidal or homicidal ideation or psychosis
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
## 2017-09-01 NOTE — ED BEHAVIORAL HEALTH ASSESSMENT NOTE - TREATMENT
Pt has history of one prior rehab admission, Pt unable to recall dates and name of rehab. Pt recently (until 3 weeks ago)receiving outpatient at [facility]. unknown

## 2017-09-01 NOTE — ED BEHAVIORAL HEALTH ASSESSMENT NOTE - DESCRIPTION (FIRST USE, LAST USE, QUANTITY, FREQUENCY, DURATION)
Per Pt no active substance use. Pt has history of using various opioids and heroin. Mother reports history of xanax and Klonopin abuse x10 years ago. Pt denies Benzodiazepine abuse.

## 2017-09-11 ENCOUNTER — LABORATORY RESULT (OUTPATIENT)
Age: 27
End: 2017-09-11

## 2017-09-11 ENCOUNTER — APPOINTMENT (OUTPATIENT)
Dept: INTERNAL MEDICINE | Facility: CLINIC | Age: 27
End: 2017-09-11
Payer: SELF-PAY

## 2017-09-11 PROCEDURE — 99499D: CUSTOM

## 2017-09-15 LAB — SUBOXONE: NORMAL

## 2017-09-16 LAB
AMPHET UR-MCNC: NEGATIVE
BARBITURATES UR-MCNC: NEGATIVE
BENZODIAZ UR-MCNC: NEGATIVE
COCAINE METAB.OTHER UR-MCNC: NEGATIVE
CREATININE, URINE: 123 MG/DL
METHADONE UR-MCNC: NEGATIVE
METHAQUALONE UR-MCNC: NEGATIVE
OPIATES UR-MCNC: NEGATIVE
PCP UR-MCNC: NEGATIVE
PROPOXYPH UR QL: NEGATIVE
THC UR QL: NORMAL

## 2017-10-03 ENCOUNTER — LABORATORY RESULT (OUTPATIENT)
Age: 27
End: 2017-10-03

## 2017-10-03 ENCOUNTER — APPOINTMENT (OUTPATIENT)
Dept: INTERNAL MEDICINE | Facility: CLINIC | Age: 27
End: 2017-10-03
Payer: SELF-PAY

## 2017-10-03 PROCEDURE — 99499D: CUSTOM

## 2017-10-03 RX ORDER — FLUOXETINE HYDROCHLORIDE 20 MG/1
20 CAPSULE ORAL
Qty: 60 | Refills: 0 | Status: DISCONTINUED | COMMUNITY
Start: 2017-05-03

## 2017-10-03 RX ORDER — BUPRENORPHINE HYDROCHLORIDE, NALOXONE HYDROCHLORIDE 8; 2 MG/1; MG/1
8-2 FILM, SOLUBLE BUCCAL; SUBLINGUAL
Qty: 18 | Refills: 0 | Status: DISCONTINUED | COMMUNITY
Start: 2017-04-18

## 2017-10-03 RX ORDER — GABAPENTIN 600 MG/1
600 TABLET, COATED ORAL
Qty: 90 | Refills: 0 | Status: DISCONTINUED | COMMUNITY
Start: 2017-06-27

## 2017-10-03 RX ORDER — QUETIAPINE FUMARATE 100 MG/1
100 TABLET ORAL
Qty: 14 | Refills: 0 | Status: DISCONTINUED | COMMUNITY
Start: 2017-04-25

## 2017-10-03 RX ORDER — CEPHALEXIN 500 MG/1
500 CAPSULE ORAL
Qty: 21 | Refills: 0 | Status: DISCONTINUED | COMMUNITY
Start: 2017-05-19

## 2017-10-03 RX ORDER — QUETIAPINE FUMARATE 50 MG/1
50 TABLET ORAL
Qty: 7 | Refills: 0 | Status: DISCONTINUED | COMMUNITY
Start: 2017-04-25

## 2017-10-03 RX ORDER — GABAPENTIN 400 MG/1
400 CAPSULE ORAL
Qty: 21 | Refills: 0 | Status: DISCONTINUED | COMMUNITY
Start: 2017-04-18

## 2017-10-03 RX ORDER — ALBUTEROL SULFATE 90 UG/1
108 (90 BASE) AEROSOL, METERED RESPIRATORY (INHALATION)
Qty: 18 | Refills: 0 | Status: ACTIVE | COMMUNITY
Start: 2017-01-11

## 2017-10-03 RX ORDER — CLONAZEPAM 1 MG/1
1 TABLET ORAL
Qty: 10 | Refills: 0 | Status: DISCONTINUED | COMMUNITY
Start: 2017-06-04

## 2017-10-03 RX ORDER — OMEPRAZOLE 20 MG/1
20 CAPSULE, DELAYED RELEASE ORAL
Qty: 30 | Refills: 0 | Status: ACTIVE | COMMUNITY
Start: 2017-09-06

## 2017-10-03 RX ORDER — SULFAMETHOXAZOLE AND TRIMETHOPRIM 800; 160 MG/1; MG/1
800-160 TABLET ORAL
Qty: 14 | Refills: 0 | Status: DISCONTINUED | COMMUNITY
Start: 2017-05-19

## 2017-10-05 ENCOUNTER — OTHER (OUTPATIENT)
Age: 27
End: 2017-10-05

## 2017-10-05 ENCOUNTER — MEDICATION RENEWAL (OUTPATIENT)
Age: 27
End: 2017-10-05

## 2017-10-10 ENCOUNTER — MEDICATION RENEWAL (OUTPATIENT)
Age: 27
End: 2017-10-10

## 2017-10-10 ENCOUNTER — OTHER (OUTPATIENT)
Age: 27
End: 2017-10-10

## 2017-10-11 LAB
AMPHET UR-MCNC: NEGATIVE
BARBITURATES UR-MCNC: NEGATIVE
BENZODIAZ UR-MCNC: NEGATIVE
COCAINE METAB.OTHER UR-MCNC: NEGATIVE
CREATININE, URINE: 136.3 MG/DL
METHADONE UR-MCNC: NEGATIVE
METHAQUALONE UR-MCNC: NEGATIVE
OPIATES UR-MCNC: NEGATIVE
PCP UR-MCNC: NEGATIVE
PROPOXYPH UR QL: NEGATIVE
SUBOXONE: NORMAL
THC UR QL: NORMAL

## 2017-10-18 ENCOUNTER — MEDICATION RENEWAL (OUTPATIENT)
Age: 27
End: 2017-10-18

## 2017-10-19 ENCOUNTER — MEDICATION RENEWAL (OUTPATIENT)
Age: 27
End: 2017-10-19

## 2017-11-03 ENCOUNTER — MEDICATION RENEWAL (OUTPATIENT)
Age: 27
End: 2017-11-03

## 2017-11-03 ENCOUNTER — APPOINTMENT (OUTPATIENT)
Dept: INTERNAL MEDICINE | Facility: CLINIC | Age: 27
End: 2017-11-03
Payer: SELF-PAY

## 2017-11-03 PROCEDURE — 99499D: CUSTOM

## 2017-12-01 ENCOUNTER — APPOINTMENT (OUTPATIENT)
Dept: INTERNAL MEDICINE | Facility: CLINIC | Age: 27
End: 2017-12-01

## 2017-12-23 ENCOUNTER — EMERGENCY (EMERGENCY)
Facility: HOSPITAL | Age: 27
LOS: 0 days | Discharge: HOME | End: 2017-12-24
Attending: EMERGENCY MEDICINE | Admitting: EMERGENCY MEDICINE
Payer: MEDICAID

## 2017-12-23 VITALS
TEMPERATURE: 99 F | WEIGHT: 166.89 LBS | DIASTOLIC BLOOD PRESSURE: 92 MMHG | RESPIRATION RATE: 16 BRPM | SYSTOLIC BLOOD PRESSURE: 128 MMHG | HEART RATE: 128 BPM | OXYGEN SATURATION: 100 % | HEIGHT: 73 IN

## 2017-12-23 DIAGNOSIS — F43.9 REACTION TO SEVERE STRESS, UNSPECIFIED: ICD-10-CM

## 2017-12-23 DIAGNOSIS — Z79.899 OTHER LONG TERM (CURRENT) DRUG THERAPY: ICD-10-CM

## 2017-12-23 DIAGNOSIS — R41.82 ALTERED MENTAL STATUS, UNSPECIFIED: ICD-10-CM

## 2017-12-23 PROCEDURE — 99285 EMERGENCY DEPT VISIT HI MDM: CPT | Mod: 25

## 2017-12-23 PROCEDURE — 90792 PSYCH DIAG EVAL W/MED SRVCS: CPT | Mod: GT
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
## 2017-12-23 NOTE — ED ADULT NURSE NOTE - CHPI ED SYMPTOMS NEG
no homicidal/no suicidal/no agitation/no weight loss/no disorientation/no weakness/no confusion/no hallucinations/no paranoia/no change in level of consciousness

## 2017-12-23 NOTE — ED ADULT NURSE REASSESSMENT NOTE - NS ED NURSE REASSESS COMMENT FT1
Phone provided for comfort. pt talking to Maisha. 1:1 at bedside for safety. Will cont to monitor for safety and comfort.
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
## 2017-12-23 NOTE — ED ADULT NURSE REASSESSMENT NOTE - NS ED NURSE REASSESS COMMENT FT1
Belongings walked over to security and locked. 1:1 at bedside for safety. Will cont to monitor for safety and comfort.

## 2017-12-24 VITALS
DIASTOLIC BLOOD PRESSURE: 84 MMHG | RESPIRATION RATE: 18 BRPM | HEART RATE: 79 BPM | OXYGEN SATURATION: 100 % | TEMPERATURE: 98 F | SYSTOLIC BLOOD PRESSURE: 120 MMHG

## 2017-12-24 LAB
ALBUMIN SERPL ELPH-MCNC: 4.7 G/DL — SIGNIFICANT CHANGE UP (ref 3.3–5)
ALP SERPL-CCNC: 100 U/L — SIGNIFICANT CHANGE UP (ref 40–120)
ALT FLD-CCNC: 182 U/L — HIGH (ref 12–78)
AMPHET UR-MCNC: NEGATIVE — SIGNIFICANT CHANGE UP
ANION GAP SERPL CALC-SCNC: 7 MMOL/L — SIGNIFICANT CHANGE UP (ref 5–17)
APAP SERPL-MCNC: < 2 UG/ML — SIGNIFICANT CHANGE UP (ref 10–30)
APPEARANCE UR: CLEAR — SIGNIFICANT CHANGE UP
AST SERPL-CCNC: 58 U/L — HIGH (ref 15–37)
BACTERIA # UR AUTO: (no result)
BARBITURATES UR SCN-MCNC: NEGATIVE — SIGNIFICANT CHANGE UP
BASOPHILS # BLD AUTO: 0.1 K/UL — SIGNIFICANT CHANGE UP (ref 0–0.2)
BASOPHILS NFR BLD AUTO: 0.7 % — SIGNIFICANT CHANGE UP (ref 0–2)
BENZODIAZ UR-MCNC: POSITIVE — SIGNIFICANT CHANGE UP
BILIRUB SERPL-MCNC: 0.6 MG/DL — SIGNIFICANT CHANGE UP (ref 0.2–1.2)
BILIRUB UR-MCNC: NEGATIVE — SIGNIFICANT CHANGE UP
BUN SERPL-MCNC: 8 MG/DL — SIGNIFICANT CHANGE UP (ref 7–23)
CALCIUM SERPL-MCNC: 9.2 MG/DL — SIGNIFICANT CHANGE UP (ref 8.5–10.1)
CHLORIDE SERPL-SCNC: 105 MMOL/L — SIGNIFICANT CHANGE UP (ref 96–108)
CO2 SERPL-SCNC: 29 MMOL/L — SIGNIFICANT CHANGE UP (ref 22–31)
COCAINE METAB.OTHER UR-MCNC: NEGATIVE — SIGNIFICANT CHANGE UP
COLOR SPEC: YELLOW — SIGNIFICANT CHANGE UP
CREAT SERPL-MCNC: 1 MG/DL — SIGNIFICANT CHANGE UP (ref 0.5–1.3)
DIFF PNL FLD: NEGATIVE — SIGNIFICANT CHANGE UP
EOSINOPHIL # BLD AUTO: 0.8 K/UL — HIGH (ref 0–0.5)
EOSINOPHIL NFR BLD AUTO: 9.5 % — HIGH (ref 0–6)
EPI CELLS # UR: NEGATIVE — SIGNIFICANT CHANGE UP
ETHANOL SERPL-MCNC: <10 MG/DL — SIGNIFICANT CHANGE UP (ref 0–10)
GLUCOSE SERPL-MCNC: 89 MG/DL — SIGNIFICANT CHANGE UP (ref 70–99)
GLUCOSE UR QL: NEGATIVE MG/DL — SIGNIFICANT CHANGE UP
HCT VFR BLD CALC: 45.9 % — SIGNIFICANT CHANGE UP (ref 39–50)
HGB BLD-MCNC: 15.6 G/DL — SIGNIFICANT CHANGE UP (ref 13–17)
KETONES UR-MCNC: NEGATIVE — SIGNIFICANT CHANGE UP
LEUKOCYTE ESTERASE UR-ACNC: (no result)
LYMPHOCYTES # BLD AUTO: 2.7 K/UL — SIGNIFICANT CHANGE UP (ref 1–3.3)
LYMPHOCYTES # BLD AUTO: 31.6 % — SIGNIFICANT CHANGE UP (ref 13–44)
MCHC RBC-ENTMCNC: 29.8 PG — SIGNIFICANT CHANGE UP (ref 27–34)
MCHC RBC-ENTMCNC: 34 GM/DL — SIGNIFICANT CHANGE UP (ref 32–36)
MCV RBC AUTO: 87.7 FL — SIGNIFICANT CHANGE UP (ref 80–100)
METHADONE UR-MCNC: NEGATIVE — SIGNIFICANT CHANGE UP
MONOCYTES # BLD AUTO: 0.7 K/UL — SIGNIFICANT CHANGE UP (ref 0–0.9)
MONOCYTES NFR BLD AUTO: 7.7 % — SIGNIFICANT CHANGE UP (ref 2–14)
NEUTROPHILS # BLD AUTO: 4.4 K/UL — SIGNIFICANT CHANGE UP (ref 1.8–7.4)
NEUTROPHILS NFR BLD AUTO: 50.5 % — SIGNIFICANT CHANGE UP (ref 43–77)
NITRITE UR-MCNC: NEGATIVE — SIGNIFICANT CHANGE UP
OPIATES UR-MCNC: NEGATIVE — SIGNIFICANT CHANGE UP
PCP SPEC-MCNC: SIGNIFICANT CHANGE UP
PCP UR-MCNC: NEGATIVE — SIGNIFICANT CHANGE UP
PH UR: 6.5 — SIGNIFICANT CHANGE UP (ref 5–8)
PLATELET # BLD AUTO: 251 K/UL — SIGNIFICANT CHANGE UP (ref 150–400)
POTASSIUM SERPL-MCNC: 3.8 MMOL/L — SIGNIFICANT CHANGE UP (ref 3.5–5.3)
POTASSIUM SERPL-SCNC: 3.8 MMOL/L — SIGNIFICANT CHANGE UP (ref 3.5–5.3)
PROT SERPL-MCNC: 8 GM/DL — SIGNIFICANT CHANGE UP (ref 6–8.3)
PROT UR-MCNC: NEGATIVE MG/DL — SIGNIFICANT CHANGE UP
RBC # BLD: 5.24 M/UL — SIGNIFICANT CHANGE UP (ref 4.2–5.8)
RBC # FLD: 11.4 % — SIGNIFICANT CHANGE UP (ref 10.3–14.5)
RBC CASTS # UR COMP ASSIST: SIGNIFICANT CHANGE UP /HPF (ref 0–4)
SALICYLATES SERPL-MCNC: <1.7 MG/DL — LOW (ref 2.8–20)
SODIUM SERPL-SCNC: 141 MMOL/L — SIGNIFICANT CHANGE UP (ref 135–145)
SP GR SPEC: 1.01 — SIGNIFICANT CHANGE UP (ref 1.01–1.02)
THC UR QL: POSITIVE — SIGNIFICANT CHANGE UP
UROBILINOGEN FLD QL: NEGATIVE MG/DL — SIGNIFICANT CHANGE UP
WBC # BLD: 8.7 K/UL — SIGNIFICANT CHANGE UP (ref 3.8–10.5)
WBC # FLD AUTO: 8.7 K/UL — SIGNIFICANT CHANGE UP (ref 3.8–10.5)
WBC UR QL: SIGNIFICANT CHANGE UP

## 2017-12-24 PROCEDURE — 93010 ELECTROCARDIOGRAM REPORT: CPT

## 2017-12-24 NOTE — ED BEHAVIORAL HEALTH ASSESSMENT NOTE - SUICIDE PROTECTIVE FACTORS
Future oriented/Identifies reasons for living/Supportive social network or family/Engaged in work or school/Responsibility to family and others

## 2017-12-24 NOTE — ED BEHAVIORAL HEALTH ASSESSMENT NOTE - DESCRIPTION (FIRST USE, LAST USE, QUANTITY, FREQUENCY, DURATION)
admits to cannabis last week; Utox +THC Per Pt no active substance use. Pt has history of using various opioids and heroin per record. Mother reported in prior record history of xanax and Klonopin abuse x10 years ago. Pt denies Benzodiazepine abuse.
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
## 2017-12-24 NOTE — ED BEHAVIORAL HEALTH ASSESSMENT NOTE - TREATMENT
Pt record pt has history of one prior rehab admission, Pt unable to recall dates and name of rehab. Pt recently (until 3 weeks ago)receiving outpatient at [facility]. unknown

## 2017-12-24 NOTE — ED BEHAVIORAL HEALTH ASSESSMENT NOTE - PRIVATE RESIDENCE
pt lives with rhinadenys in rented room from her parents pt lives with saúl in rented room from saúl's parents

## 2017-12-24 NOTE — ED BEHAVIORAL HEALTH ASSESSMENT NOTE - SAFETY PLAN DETAILS
Patient aware to return to ER or call 911 with any worsening depression, anxiety, suicidal or homicidal ideation or psychosis Patient aware to return to ER or call 911 with any worsening symptoms including suicidal or homicidal ideation or psychosis

## 2017-12-24 NOTE — ED PROVIDER NOTE - SKIN, MLM
Skin normal color for race.  Multiple very small superficial excoriations on dorsal side of left forearm without active bleeding.

## 2017-12-24 NOTE — ED BEHAVIORAL HEALTH ASSESSMENT NOTE - RISK ASSESSMENT
Although patient has risk factors of prior hospitalizations, positive family hx, hx of substance abuse, and impulsivity, he has protective factors of  no hx of prior suicide attempts, no self- injurious behavior,  no access to firearms, no hx of abuse, he denies current mood or psychotic symptoms, denies si/hi/avh. Patient is currently low risk of danger to himself or others and does not require inpatient psychiatric hospitalization. Although patient has risk factors of prior hospitalizations, positive family hx, hx of substance abuse, and impulsivity, he has protective factors of  no hx of prior suicide attempts, no hx of abuse, he denies current mood or psychotic symptoms, denies si/hi/avh. Patient is currently not imminent danger to himself or others and does not require inpatient psychiatric hospitalization.

## 2017-12-24 NOTE — ED PROVIDER NOTE - OBJECTIVE STATEMENT
Pt. is a 26 yo M with a hx of anxiety on xanax prescribed 2 wks ago BIB police after becoming upset about his fidenys breaking off their engagement.  Pt. states he had verbal argument with future mother in law who called his fidenys at work and when he tried to talk to saúl, she broke off engagement.  He states he was very upset and used a dull knife in his room to cut his left forearm.  He has never had cutting behavior before.  He has a crossbow in his room and martha family was worried he was going to hurt himself with the crossbow.  Police brought pt. for evaluation for possible suicide attempt.  Pt. states he owns the crossbow but denies ever touching it or trying to use it to hurt himself. Denies prior suicidal attempts.  Initially refuses to give blood or urine but becomes cooperative.  Lives with saúl and her parents in their home. Pt. is a 26 yo M with a hx of anxiety on xanax prescribed 2 wks ago BIB police after becoming upset about his fidenys breaking off their engagement.  Pt. states he had verbal argument with future mother in law who called his fidenys at work and when he tried to talk to saúl, she broke off engagement.  He states he was very upset and used a dull knife in his room to cut his left forearm.  He has never had cutting behavior before.  He has a crossbow in his room and martha family was worried he was going to hurt himself with the crossbow.  Police brought pt. for evaluation for possible suicide attempt.  Pt. states he owns the crossbow but denies ever touching it or trying to use it to hurt himself. Denies prior suicidal attempts.  Initially refuses to give blood or urine but becomes cooperative.  Lives with saúl and her parents in their home.  Pt. is UTD on all immunizations including tdap.
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
## 2017-12-24 NOTE — ED PROVIDER NOTE - PROGRESS NOTE DETAILS
Dr. Nascimento, telepsych attending cleared this patient for discharge.  Pt. requesting help with getting ride home.  Pt. will be discharged without any further ED or hospital intervention.

## 2017-12-24 NOTE — ED BEHAVIORAL HEALTH ASSESSMENT NOTE - HPI (INCLUDE ILLNESS QUALITY, SEVERITY, DURATION, TIMING, CONTEXT, MODIFYING FACTORS, ASSOCIATED SIGNS AND SYMPTOMS)
28 y/o male, domiciled in apartment with female saúl, employed at a computer store, , non- caregiver, history of Bipolar diagnosis, no known history of suicide attempts/violence, has history of at least one known psychiatric admission due to depression and SI, with legal history of arrest for violating OOP in May 2017, and substance history of prior suboxone use, Utox +THC, benzos, presents to Emergency Department via EMS following self-injurious behavior after argument with saúl.     Per patient he has been doing well without change in mood or sleep until today when he states he got "in an argument" with his saúl's mother due to "being on xanax and not thinking clearly," stating that he said "bad words" to her which reportedly prompted saúl' s Mother to contact saúl, which then led to saúl threatening calling off their engagement. Patient states he felt "wave of upset feelings" and decided to take dull knife and make superficial cuts to his forearm to cope. Denies suicidal intent or recent/current thoughts of suicidal ideation. Denies violent thoughts or homicidal ideation. He denies ongoing substance use other than "a shot of vodka" to celebrate ending work the other day and smoking cannabis with saúl last week. Patient denies current paranoia or AVH and states he feels safe to go back to apartment. Does not have plans to hurt self and no thoughts of hurting anyone else. Denies recent violent/aggressive behavior. Pt currently denies depressive symptoms, denies symptoms of michelle, denies SI/HI/P/I. Pt is future oriented, able to state responsibility to work and can identify reasons to live. Pt is able to engage in safety planning. States he is looking forward to seeing a new therapist next week at [facility]. 26 y/o male, domiciled in apartment with female saúl, employed at a computer store, , non- caregiver, history of Bipolar diagnosis, no known history of suicide attempts/violence, has history of at least one known psychiatric admission due to depression and SI, with legal history of arrest for violating OOP in May 2017, and substance history of prior suboxone use, Utox +THC, benzos, presents to Emergency Department via EMS following self-injurious behavior after argument with saúl.     Per patient he has been doing well without change in mood or sleep until today when he states he got "in an argument" with his saúl's mother due to "being on xanax and not thinking clearly," stating that he said "bad words" to her which reportedly prompted saúl' s Mother to contact saúl, which then led to saúl threatening calling off their engagement. Patient states he felt "wave of upset feelings" and decided to take dull knife and make superficial cuts to his forearm to cope. Denies suicidal intent or recent/current thoughts of suicidal ideation. Denies violent thoughts or homicidal ideation. He denies ongoing substance use other than "a shot of vodka" to celebrate ending work the other day and smoking cannabis with saúl last week. Patient denies current paranoia or AVH and states he feels safe to go back to apartment. Does not have plans to hurt self and no thoughts of hurting anyone else. Denies recent violent/aggressive behavior. Pt currently denies depressive symptoms, denies symptoms of michelle, denies SI/HI/P/I. Pt is future oriented, able to state responsibility to work and can identify reasons to live. Pt is able to engage in safety planning. States he is looking forward to seeing a new therapist next week at [facility].    Message left for mother requesting a return call. Mother, rosina Chavez [phone]. Attempted to contact Jayne Chrissie, [phone], was unable to leave a message as the voicemail was full. Also attempted to contact chrissie at number provided by police, [phone] phone rang without answer or  of voicemail. Writer spoke with ED RN who reports pt has been cooperative since arrival in ED.

## 2017-12-24 NOTE — ED ADULT NURSE REASSESSMENT NOTE - NS ED NURSE REASSESS COMMENT FT1
Pt resting comfortably in bed with no acute distress noted. Calm and cooperative. 1:1 at bedside for safety. Will cont to monitor for safety and comfort.

## 2017-12-24 NOTE — ED BEHAVIORAL HEALTH ASSESSMENT NOTE - OTHER PAST PSYCHIATRIC HISTORY (INCLUDE DETAILS REGARDING ONSET, COURSE OF ILLNESS, INPATIENT/OUTPATIENT TREATMENT)
Per prior note, pt has history of at least one prior psychiatric admission, but both Pt and mother were unable to clarify number of admissions. Pt has no known history of suicide attempts, denies history of psychosis. Pt history of one prior rehab admission, unable to recall date/ name of rehab and history of outpatient treatment. Sees outpatient psychiatrist and states he has upcoming apt at [facility] for therapy next week

## 2017-12-24 NOTE — ED BEHAVIORAL HEALTH ASSESSMENT NOTE - DESCRIPTION
calm and cooperative in Emergency Department   no agitation or prns required asthma Pt resides with saúl in saúl's parents apartment, Pt has family(mother, father and brother) who live close by and Pt reports he has a positive relationship with family. Pt has associates degree currently works full time at a computer store.

## 2018-01-04 ENCOUNTER — RX RENEWAL (OUTPATIENT)
Age: 28
End: 2018-01-04

## 2018-01-09 ENCOUNTER — TRANSCRIPTION ENCOUNTER (OUTPATIENT)
Age: 28
End: 2018-01-09

## 2018-01-15 ENCOUNTER — LABORATORY RESULT (OUTPATIENT)
Age: 28
End: 2018-01-15

## 2018-01-15 ENCOUNTER — APPOINTMENT (OUTPATIENT)
Dept: INTERNAL MEDICINE | Facility: CLINIC | Age: 28
End: 2018-01-15
Payer: SELF-PAY

## 2018-01-15 PROCEDURE — 99499D: CUSTOM

## 2018-01-21 LAB — SUBOXONE: NORMAL

## 2018-01-25 ENCOUNTER — MEDICATION RENEWAL (OUTPATIENT)
Age: 28
End: 2018-01-25

## 2018-01-25 NOTE — ED PROVIDER NOTE - CARE PLAN
HISTORY OF PRESENT ILLNESS  Nilo Knight is a 59 y.o. female. HPI Comments: Visit survey reviewed  Chief complaint- chronic pain syndrome- pain to both feet, pain to multiple joints  She will continue hydrocodone 7.5 mg 3 times a day as needed  Fentanyl patch 25 mcg every 3 days  Cymbalta 60 mg, twice a day  -The visit survey indicates that medications help general activity, mood, walking, sleep, enjoyment of life. The patient will continue medications. No new significant side effects reported  Medication continues to help improve quality of life. Medications reviewed including risk and benefits. Recent average level of pain-5,6    Measurement of clinical outcome for chronic pain patient/ SPAASMS Score Card-            Information reviewed and will be scanned. Total score today-5      Review of Systems   Constitutional: Positive for chills and malaise/fatigue. HENT: Negative for sore throat. Eyes: Negative for blurred vision and double vision. Respiratory: Positive for shortness of breath. Negative for cough. Cardiovascular: Positive for chest pain and leg swelling. Gastrointestinal: Positive for diarrhea and nausea. Genitourinary: Negative for dysuria and urgency. Musculoskeletal: Positive for back pain, falls, joint pain and neck pain. Skin: Negative for itching and rash. Neurological: Negative for dizziness, seizures and headaches. Endo/Heme/Allergies: Negative for environmental allergies. Does not bruise/bleed easily. Psychiatric/Behavioral: Positive for depression. Negative for suicidal ideas. The patient is nervous/anxious and has insomnia. Physical Exam   Constitutional: She appears well-developed and well-nourished. She is cooperative. She does not have a sickly appearance. HENT:   Head: Normocephalic and atraumatic. Right Ear: External ear normal. No drainage. Left Ear: External ear normal. No drainage.    Nose: Nose normal.   Eyes: Lids are normal. Right eye exhibits no discharge. Left eye exhibits no discharge. Right conjunctiva has no hemorrhage. Left conjunctiva has no hemorrhage. Neck: Neck supple. No tracheal deviation present. No thyroid mass present. Pulmonary/Chest: Effort normal. No respiratory distress. Neurological: She is alert. No cranial nerve deficit. Gait with roller walker   Skin: Skin is intact. No rash noted. Psychiatric: Her speech is normal. Her affect is not angry. She does not express inappropriate judgment. Nursing note and vitals reviewed. ASSESSMENT and PLAN  Encounter Diagnoses   Name Primary?  Pain in both feet Yes    Encounter for long-term (current) use of high-risk medication     Fibromyalgia     Pain in joint, multiple sites     Peripheral polyneuropathy     Chemotherapy-induced neuropathy (HCC)     Diabetic neuropathy, painful (HCC)     Systemic lupus erythematosus, unspecified SLE type, unspecified organ involvement status ([facility] [address]     Depression with anxiety    No indicators for recent medication misuse.  reviewed. Pain Meds and Quality Of Life have been reviewed. Nonpharmacologic therapy and non-opioid pharmacologic therapy were considered. If opioid therapy is prescribed, this is only if the expected benefits are anticipated to outweigh risks. Possible changes to treatment plan considered. Support/education given as needed. Today-medications are as listed. No significant changes to medications. Follow up -- 3 months. Urine test today  The patient was informed that moving forward, I will no longer be with this clinic. They were reminded that they can continue care with this clinic and I did request a follow-up for the patient with this clinic. They will also receive a list of other pain physicians/clinics in the area in case they are interested. Principal Discharge DX:	Anxiety  Instructions for follow-up, activity and diet:	1) Please follow-up with Psychiatry as an outpatient  2) If you have any worsening of symptoms please return to the ED immediately.  Secondary Diagnosis:	Depression Principal Discharge DX:	Anxiety  Instructions for follow-up, activity and diet:	1) Please follow-up with Psychiatry as an outpatient  2) If you have any worsening of symptoms please return to the ED immediately.  3) Please take KLONOPINE at bed as directed.  Secondary Diagnosis:	Depression

## 2018-01-31 ENCOUNTER — MEDICATION RENEWAL (OUTPATIENT)
Age: 28
End: 2018-01-31

## 2018-02-02 ENCOUNTER — MEDICATION RENEWAL (OUTPATIENT)
Age: 28
End: 2018-02-02

## 2018-02-07 ENCOUNTER — RX RENEWAL (OUTPATIENT)
Age: 28
End: 2018-02-07

## 2018-02-12 ENCOUNTER — MEDICATION RENEWAL (OUTPATIENT)
Age: 28
End: 2018-02-12

## 2018-02-13 ENCOUNTER — MEDICATION RENEWAL (OUTPATIENT)
Age: 28
End: 2018-02-13

## 2018-02-14 ENCOUNTER — MEDICATION RENEWAL (OUTPATIENT)
Age: 28
End: 2018-02-14

## 2018-03-07 ENCOUNTER — EMERGENCY (EMERGENCY)
Facility: HOSPITAL | Age: 28
LOS: 0 days | Discharge: HOME | End: 2018-03-07
Attending: EMERGENCY MEDICINE | Admitting: EMERGENCY MEDICINE
Payer: MEDICAID

## 2018-03-07 VITALS
SYSTOLIC BLOOD PRESSURE: 128 MMHG | WEIGHT: 169.98 LBS | OXYGEN SATURATION: 98 % | RESPIRATION RATE: 16 BRPM | HEART RATE: 73 BPM | DIASTOLIC BLOOD PRESSURE: 76 MMHG

## 2018-03-07 VITALS
HEART RATE: 60 BPM | TEMPERATURE: 97 F | DIASTOLIC BLOOD PRESSURE: 62 MMHG | SYSTOLIC BLOOD PRESSURE: 121 MMHG | RESPIRATION RATE: 16 BRPM | OXYGEN SATURATION: 100 %

## 2018-03-07 DIAGNOSIS — Z79.899 OTHER LONG TERM (CURRENT) DRUG THERAPY: ICD-10-CM

## 2018-03-07 DIAGNOSIS — G43.A0 CYCLICAL VOMITING, IN MIGRAINE, NOT INTRACTABLE: ICD-10-CM

## 2018-03-07 DIAGNOSIS — R10.9 UNSPECIFIED ABDOMINAL PAIN: ICD-10-CM

## 2018-03-07 DIAGNOSIS — F11.29 OPIOID DEPENDENCE WITH UNSPECIFIED OPIOID-INDUCED DISORDER: ICD-10-CM

## 2018-03-07 LAB
ALBUMIN SERPL ELPH-MCNC: 4.7 G/DL — SIGNIFICANT CHANGE UP (ref 3.3–5)
ALP SERPL-CCNC: 84 U/L — SIGNIFICANT CHANGE UP (ref 40–120)
ALT FLD-CCNC: 95 U/L — HIGH (ref 12–78)
ANION GAP SERPL CALC-SCNC: 11 MMOL/L — SIGNIFICANT CHANGE UP (ref 5–17)
APPEARANCE UR: CLEAR — SIGNIFICANT CHANGE UP
APTT BLD: 26.9 SEC — LOW (ref 27.5–37.4)
AST SERPL-CCNC: 61 U/L — HIGH (ref 15–37)
BACTERIA # UR AUTO: (no result)
BASOPHILS NFR BLD AUTO: 0 % — SIGNIFICANT CHANGE UP (ref 0–2)
BILIRUB SERPL-MCNC: 2.2 MG/DL — HIGH (ref 0.2–1.2)
BILIRUB UR-MCNC: NEGATIVE — SIGNIFICANT CHANGE UP
BLD GP AB SCN SERPL QL: SIGNIFICANT CHANGE UP
BUN SERPL-MCNC: 13 MG/DL — SIGNIFICANT CHANGE UP (ref 7–23)
CALCIUM SERPL-MCNC: 9.6 MG/DL — SIGNIFICANT CHANGE UP (ref 8.5–10.1)
CHLORIDE SERPL-SCNC: 105 MMOL/L — SIGNIFICANT CHANGE UP (ref 96–108)
CO2 SERPL-SCNC: 22 MMOL/L — SIGNIFICANT CHANGE UP (ref 22–31)
COLOR SPEC: YELLOW — SIGNIFICANT CHANGE UP
COMMENT - URINE: SIGNIFICANT CHANGE UP
CREAT SERPL-MCNC: 1.16 MG/DL — SIGNIFICANT CHANGE UP (ref 0.5–1.3)
DIFF PNL FLD: NEGATIVE — SIGNIFICANT CHANGE UP
EOSINOPHIL NFR BLD AUTO: 1 % — SIGNIFICANT CHANGE UP (ref 0–6)
EPI CELLS # UR: SIGNIFICANT CHANGE UP
GLUCOSE SERPL-MCNC: 113 MG/DL — HIGH (ref 70–99)
GLUCOSE UR QL: NEGATIVE MG/DL — SIGNIFICANT CHANGE UP
HCT VFR BLD CALC: 42.8 % — SIGNIFICANT CHANGE UP (ref 39–50)
HGB BLD-MCNC: 14.7 G/DL — SIGNIFICANT CHANGE UP (ref 13–17)
INR BLD: 1.12 RATIO — SIGNIFICANT CHANGE UP (ref 0.88–1.16)
KETONES UR-MCNC: (no result)
LEUKOCYTE ESTERASE UR-ACNC: NEGATIVE — SIGNIFICANT CHANGE UP
LIDOCAIN IGE QN: 106 U/L — SIGNIFICANT CHANGE UP (ref 73–393)
LYMPHOCYTES # BLD AUTO: 0.39 K/UL — LOW (ref 1–3.3)
LYMPHOCYTES # BLD AUTO: 4 % — LOW (ref 13–44)
MANUAL SMEAR VERIFICATION: SIGNIFICANT CHANGE UP
MCHC RBC-ENTMCNC: 29.4 PG — SIGNIFICANT CHANGE UP (ref 27–34)
MCHC RBC-ENTMCNC: 34.3 GM/DL — SIGNIFICANT CHANGE UP (ref 32–36)
MCV RBC AUTO: 85.6 FL — SIGNIFICANT CHANGE UP (ref 80–100)
MONOCYTES NFR BLD AUTO: 3 % — SIGNIFICANT CHANGE UP (ref 2–14)
NEUTROPHILS # BLD AUTO: 8.9 K/UL — HIGH (ref 1.8–7.4)
NEUTROPHILS NFR BLD AUTO: 92 % — HIGH (ref 43–77)
NITRITE UR-MCNC: NEGATIVE — SIGNIFICANT CHANGE UP
NRBC # BLD: 0 /100 — SIGNIFICANT CHANGE UP (ref 0–0)
NRBC # BLD: SIGNIFICANT CHANGE UP /100 WBCS (ref 0–0)
PCP SPEC-MCNC: SIGNIFICANT CHANGE UP
PH UR: 9 — HIGH (ref 5–8)
PLAT MORPH BLD: NORMAL — SIGNIFICANT CHANGE UP
PLATELET # BLD AUTO: 195 K/UL — SIGNIFICANT CHANGE UP (ref 150–400)
POTASSIUM SERPL-MCNC: 3.8 MMOL/L — SIGNIFICANT CHANGE UP (ref 3.5–5.3)
POTASSIUM SERPL-SCNC: 3.8 MMOL/L — SIGNIFICANT CHANGE UP (ref 3.5–5.3)
PROT SERPL-MCNC: 8 GM/DL — SIGNIFICANT CHANGE UP (ref 6–8.3)
PROT UR-MCNC: 15 MG/DL
PROTHROM AB SERPL-ACNC: 12.1 SEC — SIGNIFICANT CHANGE UP (ref 9.8–12.7)
RBC # BLD: 5 M/UL — SIGNIFICANT CHANGE UP (ref 4.2–5.8)
RBC # FLD: 12.6 % — SIGNIFICANT CHANGE UP (ref 10.3–14.5)
RBC BLD AUTO: NORMAL — SIGNIFICANT CHANGE UP
RBC CASTS # UR COMP ASSIST: NEGATIVE /HPF — SIGNIFICANT CHANGE UP (ref 0–4)
SODIUM SERPL-SCNC: 138 MMOL/L — SIGNIFICANT CHANGE UP (ref 135–145)
SP GR SPEC: 1.01 — SIGNIFICANT CHANGE UP (ref 1.01–1.02)
TYPE + AB SCN PNL BLD: SIGNIFICANT CHANGE UP
UROBILINOGEN FLD QL: 4 MG/DL
WBC # BLD: 9.67 K/UL — SIGNIFICANT CHANGE UP (ref 3.8–10.5)
WBC # FLD AUTO: 9.67 K/UL — SIGNIFICANT CHANGE UP (ref 3.8–10.5)
WBC UR QL: SIGNIFICANT CHANGE UP

## 2018-03-07 PROCEDURE — 74174 CTA ABD&PLVS W/CONTRAST: CPT | Mod: 26

## 2018-03-07 PROCEDURE — 99285 EMERGENCY DEPT VISIT HI MDM: CPT

## 2018-03-07 PROCEDURE — 71045 X-RAY EXAM CHEST 1 VIEW: CPT | Mod: 26

## 2018-03-07 RX ORDER — MORPHINE SULFATE 50 MG/1
4 CAPSULE, EXTENDED RELEASE ORAL ONCE
Qty: 0 | Refills: 0 | Status: DISCONTINUED | OUTPATIENT
Start: 2018-03-07 | End: 2018-03-07

## 2018-03-07 RX ORDER — ONDANSETRON 8 MG/1
4 TABLET, FILM COATED ORAL ONCE
Qty: 0 | Refills: 0 | Status: COMPLETED | OUTPATIENT
Start: 2018-03-07 | End: 2018-03-07

## 2018-03-07 RX ORDER — PANTOPRAZOLE SODIUM 20 MG/1
40 TABLET, DELAYED RELEASE ORAL ONCE
Qty: 0 | Refills: 0 | Status: COMPLETED | OUTPATIENT
Start: 2018-03-07 | End: 2018-03-07

## 2018-03-07 RX ORDER — HYDROMORPHONE HYDROCHLORIDE 2 MG/ML
1 INJECTION INTRAMUSCULAR; INTRAVENOUS; SUBCUTANEOUS ONCE
Qty: 0 | Refills: 0 | Status: DISCONTINUED | OUTPATIENT
Start: 2018-03-07 | End: 2018-03-07

## 2018-03-07 RX ORDER — SODIUM CHLORIDE 9 MG/ML
1000 INJECTION INTRAMUSCULAR; INTRAVENOUS; SUBCUTANEOUS ONCE
Qty: 0 | Refills: 0 | Status: COMPLETED | OUTPATIENT
Start: 2018-03-07 | End: 2018-03-07

## 2018-03-07 RX ORDER — SODIUM CHLORIDE 9 MG/ML
3 INJECTION INTRAMUSCULAR; INTRAVENOUS; SUBCUTANEOUS ONCE
Qty: 0 | Refills: 0 | Status: COMPLETED | OUTPATIENT
Start: 2018-03-07 | End: 2018-03-07

## 2018-03-07 RX ORDER — ONDANSETRON 8 MG/1
1 TABLET, FILM COATED ORAL
Qty: 9 | Refills: 0 | OUTPATIENT
Start: 2018-03-07 | End: 2018-03-09

## 2018-03-07 RX ADMIN — ONDANSETRON 4 MILLIGRAM(S): 8 TABLET, FILM COATED ORAL at 13:17

## 2018-03-07 RX ADMIN — SODIUM CHLORIDE 3 MILLILITER(S): 9 INJECTION INTRAMUSCULAR; INTRAVENOUS; SUBCUTANEOUS at 13:21

## 2018-03-07 RX ADMIN — MORPHINE SULFATE 4 MILLIGRAM(S): 50 CAPSULE, EXTENDED RELEASE ORAL at 13:19

## 2018-03-07 RX ADMIN — PANTOPRAZOLE SODIUM 40 MILLIGRAM(S): 20 TABLET, DELAYED RELEASE ORAL at 13:21

## 2018-03-07 RX ADMIN — HYDROMORPHONE HYDROCHLORIDE 1 MILLIGRAM(S): 2 INJECTION INTRAMUSCULAR; INTRAVENOUS; SUBCUTANEOUS at 15:10

## 2018-03-07 RX ADMIN — SODIUM CHLORIDE 1000 MILLILITER(S): 9 INJECTION INTRAMUSCULAR; INTRAVENOUS; SUBCUTANEOUS at 12:21

## 2018-03-07 RX ADMIN — SODIUM CHLORIDE 1000 MILLILITER(S): 9 INJECTION INTRAMUSCULAR; INTRAVENOUS; SUBCUTANEOUS at 16:15

## 2018-03-07 RX ADMIN — HYDROMORPHONE HYDROCHLORIDE 1 MILLIGRAM(S): 2 INJECTION INTRAMUSCULAR; INTRAVENOUS; SUBCUTANEOUS at 14:17

## 2018-03-07 RX ADMIN — MORPHINE SULFATE 4 MILLIGRAM(S): 50 CAPSULE, EXTENDED RELEASE ORAL at 13:30

## 2018-03-07 RX ADMIN — ONDANSETRON 4 MILLIGRAM(S): 8 TABLET, FILM COATED ORAL at 14:19

## 2018-03-07 NOTE — ED ADULT NURSE NOTE - OBJECTIVE STATEMENT
pt c/o abd pain and vomiting blood started yesterday. visual old track to right ac . pt states he used to inject morphine but not using any more stopped on 12/16. pt states he has depression currently seeing phycologist and psychiatrist denies any suicidal thoughts  .pt looks pale. pt c/o abd pain and vomiting blood started yesterday. visual old track to right ac . pt states he used to inject morphine but not using any more stopped on 12/16. pt states he has depression currently seeing phycologist and psychiatrist denies any suicidal thoughts  .pt looks pale.pt is begging for pain medication.

## 2018-03-07 NOTE — ED PROVIDER NOTE - OBJECTIVE STATEMENT
28 year old male with a hx of cyclic vomiting syndrome presents to the ED complaining sudden onset of severe sharp upper abd pain and vomiting blood started  ~12 times in the last 14 hours. Denies subjective fever. This has happened 10 years ago- did not know what it was. +visual old track to right ac. Pt states he used to inject morphine but not using any more stopped on 12/16. Patient feels weak and tired. Pt actively vomiting during physical exam. NKDA. No PMD currently.

## 2018-03-07 NOTE — ED ADULT TRIAGE NOTE - CHIEF COMPLAINT QUOTE
pt presents to ED with complaints of abdominal pain and vomiting pt with visible track marks on right arm

## 2018-03-07 NOTE — ED PROVIDER NOTE - MEDICAL DECISION MAKING DETAILS
27 y/o M hx of cyclic vomiting syndrome BIBA from home c/o 1 day of mid upper abd pain 14 hours repeated vomiting with blood, + mid upper abd tenderness, actively vomiting during exam plan IV labs,  IV Zofran, IV pain medication, CT abd pelvis with IV contrast, IV fluids, observe and re-ass.

## 2018-03-07 NOTE — ED PROVIDER NOTE - MUSCULOSKELETAL, MLM
ALBERTO x4 no focal swelling tenderness. Spine appears normal, range of motion is not limited, no muscle or joint tenderness

## 2018-03-07 NOTE — ED PROVIDER NOTE - CARE PLAN
Principal Discharge DX:	Cyclic vomiting syndrome  Secondary Diagnosis:	Pain of upper abdomen Principal Discharge DX:	Cyclic vomiting syndrome  Secondary Diagnosis:	Pain of upper abdomen  Secondary Diagnosis:	Narcotic dependence

## 2018-03-07 NOTE — ED PROVIDER NOTE - PROGRESS NOTE DETAILS
Dr. Chauhan:  Frequency of pt's V decreased.  Case signed out to Dr. Chan:  [] CT A/P,  [] po trial,   [] dispo. Rocio MONTENEGRO: Received s/o from Dr. Chauhan; no acute pathology on CT scan; patient reports that he has had cyclic vomiting x 10 years and requests to be "finally admitted" this time to the hospital; I explained to patient that there is no indication to admit patient; patient then requested more dilaudid- I explained to patient that he will not be receiving any more narcotic analgesics at this time; mother called ED at this time- reports that patient with hx of substance abuse and should not be receiving narcotics in ED; patient to be discharged at this time; no active vomiting; patient offered water at this time- would like to be discharged at this time and go home to fill his rx for zofran prior to drinking PO; understands need to f/u with GI on call in 1-2 days without fail; return to ED for any other complaints.

## 2018-03-07 NOTE — ED PROVIDER NOTE - CONSTITUTIONAL, MLM
normal... white adult male ill appearing non toxic well nourished, awake, alert, oriented to person, place, time/situation and in no apparent distress.

## 2018-03-07 NOTE — ED PROVIDER NOTE - CARDIAC, MLM
normal radial pulses. Normal rate, regular rhythm.  Heart sounds S1, S2.  No murmurs, rubs or gallops.

## 2018-03-07 NOTE — ED PROVIDER NOTE - ENMT, MLM
mucus membranes mildly dry, oropharynx clear. Airway patent, Nasal mucosa clear. Mouth with normal mucosa. Throat has no vesicles, no oropharyngeal exudates and uvula is midline.

## 2018-03-09 ENCOUNTER — INPATIENT (INPATIENT)
Facility: HOSPITAL | Age: 28
LOS: 3 days | End: 2018-03-13
Attending: PSYCHIATRY & NEUROLOGY | Admitting: PSYCHIATRY & NEUROLOGY
Payer: MEDICAID

## 2018-03-09 VITALS
TEMPERATURE: 100 F | DIASTOLIC BLOOD PRESSURE: 92 MMHG | OXYGEN SATURATION: 100 % | SYSTOLIC BLOOD PRESSURE: 153 MMHG | RESPIRATION RATE: 20 BRPM | WEIGHT: 220.02 LBS | HEIGHT: 72 IN | HEART RATE: 66 BPM

## 2018-03-09 DIAGNOSIS — T50.902D POISONING BY UNSPECIFIED DRUGS, MEDICAMENTS AND BIOLOGICAL SUBSTANCES, INTENTIONAL SELF-HARM, SUBSEQUENT ENCOUNTER: ICD-10-CM

## 2018-03-09 DIAGNOSIS — F19.10 OTHER PSYCHOACTIVE SUBSTANCE ABUSE, UNCOMPLICATED: ICD-10-CM

## 2018-03-09 DIAGNOSIS — F60.3 BORDERLINE PERSONALITY DISORDER: ICD-10-CM

## 2018-03-09 DIAGNOSIS — F39 UNSPECIFIED MOOD [AFFECTIVE] DISORDER: ICD-10-CM

## 2018-03-09 LAB
ALBUMIN SERPL ELPH-MCNC: 4.3 G/DL — SIGNIFICANT CHANGE UP (ref 3.3–5)
ALP SERPL-CCNC: 72 U/L — SIGNIFICANT CHANGE UP (ref 40–120)
ALT FLD-CCNC: 97 U/L — HIGH (ref 12–78)
AMPHET UR-MCNC: NEGATIVE — SIGNIFICANT CHANGE UP
ANION GAP SERPL CALC-SCNC: 9 MMOL/L — SIGNIFICANT CHANGE UP (ref 5–17)
APAP SERPL-MCNC: <2 UG/ML — LOW (ref 10–30)
APPEARANCE UR: CLEAR — SIGNIFICANT CHANGE UP
AST SERPL-CCNC: 39 U/L — HIGH (ref 15–37)
BACTERIA # UR AUTO: (no result)
BARBITURATES UR SCN-MCNC: NEGATIVE — SIGNIFICANT CHANGE UP
BASOPHILS # BLD AUTO: 0.01 K/UL — SIGNIFICANT CHANGE UP (ref 0–0.2)
BASOPHILS NFR BLD AUTO: 0.2 % — SIGNIFICANT CHANGE UP (ref 0–2)
BENZODIAZ UR-MCNC: POSITIVE — SIGNIFICANT CHANGE UP
BILIRUB SERPL-MCNC: 1 MG/DL — SIGNIFICANT CHANGE UP (ref 0.2–1.2)
BILIRUB UR-MCNC: NEGATIVE — SIGNIFICANT CHANGE UP
BUN SERPL-MCNC: 14 MG/DL — SIGNIFICANT CHANGE UP (ref 7–23)
CALCIUM SERPL-MCNC: 8.9 MG/DL — SIGNIFICANT CHANGE UP (ref 8.5–10.1)
CHLORIDE SERPL-SCNC: 109 MMOL/L — HIGH (ref 96–108)
CO2 SERPL-SCNC: 24 MMOL/L — SIGNIFICANT CHANGE UP (ref 22–31)
COCAINE METAB.OTHER UR-MCNC: NEGATIVE — SIGNIFICANT CHANGE UP
COLOR SPEC: YELLOW — SIGNIFICANT CHANGE UP
CREAT SERPL-MCNC: 1.02 MG/DL — SIGNIFICANT CHANGE UP (ref 0.5–1.3)
DIFF PNL FLD: NEGATIVE — SIGNIFICANT CHANGE UP
EOSINOPHIL # BLD AUTO: 0.09 K/UL — SIGNIFICANT CHANGE UP (ref 0–0.5)
EOSINOPHIL NFR BLD AUTO: 1.4 % — SIGNIFICANT CHANGE UP (ref 0–6)
EPI CELLS # UR: NEGATIVE — SIGNIFICANT CHANGE UP
ETHANOL SERPL-MCNC: <10 MG/DL — SIGNIFICANT CHANGE UP (ref 0–10)
GLUCOSE SERPL-MCNC: 94 MG/DL — SIGNIFICANT CHANGE UP (ref 70–99)
GLUCOSE UR QL: NEGATIVE MG/DL — SIGNIFICANT CHANGE UP
HCT VFR BLD CALC: 42 % — SIGNIFICANT CHANGE UP (ref 39–50)
HGB BLD-MCNC: 14.5 G/DL — SIGNIFICANT CHANGE UP (ref 13–17)
IMM GRANULOCYTES NFR BLD AUTO: 0.2 % — SIGNIFICANT CHANGE UP (ref 0–1.5)
KETONES UR-MCNC: (no result)
LEUKOCYTE ESTERASE UR-ACNC: NEGATIVE — SIGNIFICANT CHANGE UP
LYMPHOCYTES # BLD AUTO: 1.77 K/UL — SIGNIFICANT CHANGE UP (ref 1–3.3)
LYMPHOCYTES # BLD AUTO: 27.5 % — SIGNIFICANT CHANGE UP (ref 13–44)
MCHC RBC-ENTMCNC: 29.2 PG — SIGNIFICANT CHANGE UP (ref 27–34)
MCHC RBC-ENTMCNC: 34.5 GM/DL — SIGNIFICANT CHANGE UP (ref 32–36)
MCV RBC AUTO: 84.7 FL — SIGNIFICANT CHANGE UP (ref 80–100)
METHADONE UR-MCNC: NEGATIVE — SIGNIFICANT CHANGE UP
MONOCYTES # BLD AUTO: 0.61 K/UL — SIGNIFICANT CHANGE UP (ref 0–0.9)
MONOCYTES NFR BLD AUTO: 9.5 % — SIGNIFICANT CHANGE UP (ref 2–14)
NEUTROPHILS # BLD AUTO: 3.95 K/UL — SIGNIFICANT CHANGE UP (ref 1.8–7.4)
NEUTROPHILS NFR BLD AUTO: 61.2 % — SIGNIFICANT CHANGE UP (ref 43–77)
NITRITE UR-MCNC: NEGATIVE — SIGNIFICANT CHANGE UP
NRBC # BLD: 0 /100 WBCS — SIGNIFICANT CHANGE UP (ref 0–0)
OPIATES UR-MCNC: NEGATIVE — SIGNIFICANT CHANGE UP
PCP SPEC-MCNC: SIGNIFICANT CHANGE UP
PCP UR-MCNC: NEGATIVE — SIGNIFICANT CHANGE UP
PH UR: 5 — SIGNIFICANT CHANGE UP (ref 5–8)
PLATELET # BLD AUTO: 212 K/UL — SIGNIFICANT CHANGE UP (ref 150–400)
POTASSIUM SERPL-MCNC: 3.9 MMOL/L — SIGNIFICANT CHANGE UP (ref 3.5–5.3)
POTASSIUM SERPL-SCNC: 3.9 MMOL/L — SIGNIFICANT CHANGE UP (ref 3.5–5.3)
PROT SERPL-MCNC: 7.6 GM/DL — SIGNIFICANT CHANGE UP (ref 6–8.3)
PROT UR-MCNC: 30 MG/DL
RBC # BLD: 4.96 M/UL — SIGNIFICANT CHANGE UP (ref 4.2–5.8)
RBC # FLD: 13.2 % — SIGNIFICANT CHANGE UP (ref 10.3–14.5)
RBC CASTS # UR COMP ASSIST: SIGNIFICANT CHANGE UP /HPF (ref 0–4)
SALICYLATES SERPL-MCNC: <1.7 MG/DL — LOW (ref 2.8–20)
SODIUM SERPL-SCNC: 142 MMOL/L — SIGNIFICANT CHANGE UP (ref 135–145)
SP GR SPEC: 1.02 — SIGNIFICANT CHANGE UP (ref 1.01–1.02)
THC UR QL: POSITIVE — SIGNIFICANT CHANGE UP
UROBILINOGEN FLD QL: NEGATIVE MG/DL — SIGNIFICANT CHANGE UP
WBC # BLD: 6.44 K/UL — SIGNIFICANT CHANGE UP (ref 3.8–10.5)
WBC # FLD AUTO: 6.44 K/UL — SIGNIFICANT CHANGE UP (ref 3.8–10.5)
WBC UR QL: SIGNIFICANT CHANGE UP

## 2018-03-09 PROCEDURE — 93010 ELECTROCARDIOGRAM REPORT: CPT | Mod: 76

## 2018-03-09 PROCEDURE — 99285 EMERGENCY DEPT VISIT HI MDM: CPT

## 2018-03-09 RX ORDER — KETOROLAC TROMETHAMINE 30 MG/ML
30 SYRINGE (ML) INJECTION ONCE
Qty: 0 | Refills: 0 | Status: DISCONTINUED | OUTPATIENT
Start: 2018-03-09 | End: 2018-03-09

## 2018-03-09 RX ORDER — QUETIAPINE FUMARATE 200 MG/1
200 TABLET, FILM COATED ORAL AT BEDTIME
Qty: 0 | Refills: 0 | Status: DISCONTINUED | OUTPATIENT
Start: 2018-03-09 | End: 2018-03-09

## 2018-03-09 RX ORDER — SODIUM CHLORIDE 9 MG/ML
1000 INJECTION INTRAMUSCULAR; INTRAVENOUS; SUBCUTANEOUS ONCE
Qty: 0 | Refills: 0 | Status: COMPLETED | OUTPATIENT
Start: 2018-03-09 | End: 2018-03-09

## 2018-03-09 RX ORDER — FLUOXETINE HCL 10 MG
40 CAPSULE ORAL DAILY
Qty: 0 | Refills: 0 | Status: DISCONTINUED | OUTPATIENT
Start: 2018-03-09 | End: 2018-03-09

## 2018-03-09 RX ORDER — DIPHENHYDRAMINE HCL 50 MG
50 CAPSULE ORAL ONCE
Qty: 0 | Refills: 0 | Status: DISCONTINUED | OUTPATIENT
Start: 2018-03-09 | End: 2018-03-13

## 2018-03-09 RX ORDER — GABAPENTIN 400 MG/1
600 CAPSULE ORAL THREE TIMES A DAY
Qty: 0 | Refills: 0 | Status: DISCONTINUED | OUTPATIENT
Start: 2018-03-09 | End: 2018-03-13

## 2018-03-09 RX ORDER — HALOPERIDOL DECANOATE 100 MG/ML
5 INJECTION INTRAMUSCULAR ONCE
Qty: 0 | Refills: 0 | Status: DISCONTINUED | OUTPATIENT
Start: 2018-03-09 | End: 2018-03-12

## 2018-03-09 RX ORDER — OLANZAPINE 15 MG/1
15 TABLET, FILM COATED ORAL AT BEDTIME
Qty: 0 | Refills: 0 | Status: DISCONTINUED | OUTPATIENT
Start: 2018-03-09 | End: 2018-03-11

## 2018-03-09 RX ORDER — CLONAZEPAM 1 MG
2 TABLET ORAL EVERY 12 HOURS
Qty: 0 | Refills: 0 | Status: DISCONTINUED | OUTPATIENT
Start: 2018-03-09 | End: 2018-03-11

## 2018-03-09 RX ORDER — TRAZODONE HCL 50 MG
75 TABLET ORAL AT BEDTIME
Qty: 0 | Refills: 0 | Status: DISCONTINUED | OUTPATIENT
Start: 2018-03-09 | End: 2018-03-13

## 2018-03-09 RX ORDER — ZOLPIDEM TARTRATE 10 MG/1
5 TABLET ORAL AT BEDTIME
Qty: 0 | Refills: 0 | Status: DISCONTINUED | OUTPATIENT
Start: 2018-03-09 | End: 2018-03-09

## 2018-03-09 RX ORDER — GABAPENTIN 400 MG/1
800 CAPSULE ORAL EVERY 8 HOURS
Qty: 0 | Refills: 0 | Status: DISCONTINUED | OUTPATIENT
Start: 2018-03-09 | End: 2018-03-09

## 2018-03-09 RX ORDER — ZOLPIDEM TARTRATE 10 MG/1
5 TABLET ORAL AT BEDTIME
Qty: 0 | Refills: 0 | Status: DISCONTINUED | OUTPATIENT
Start: 2018-03-09 | End: 2018-03-13

## 2018-03-09 RX ORDER — FLUOXETINE HCL 10 MG
10 CAPSULE ORAL DAILY
Qty: 0 | Refills: 0 | Status: DISCONTINUED | OUTPATIENT
Start: 2018-03-10 | End: 2018-03-13

## 2018-03-09 RX ADMIN — Medication 40 MILLIGRAM(S): at 18:13

## 2018-03-09 RX ADMIN — SODIUM CHLORIDE 1000 MILLILITER(S): 9 INJECTION INTRAMUSCULAR; INTRAVENOUS; SUBCUTANEOUS at 11:44

## 2018-03-09 RX ADMIN — Medication 30 MILLIGRAM(S): at 11:42

## 2018-03-09 RX ADMIN — ZOLPIDEM TARTRATE 5 MILLIGRAM(S): 10 TABLET ORAL at 20:14

## 2018-03-09 RX ADMIN — SODIUM CHLORIDE 1000 MILLILITER(S): 9 INJECTION INTRAMUSCULAR; INTRAVENOUS; SUBCUTANEOUS at 10:53

## 2018-03-09 RX ADMIN — Medication 2 MILLIGRAM(S): at 17:35

## 2018-03-09 RX ADMIN — ZOLPIDEM TARTRATE 5 MILLIGRAM(S): 10 TABLET ORAL at 21:18

## 2018-03-09 NOTE — BEHAVIORAL HEALTH ASSESSMENT NOTE - NSBHCHARTREVIEWVS_PSY_A_CORE FT
Vital Signs Last 24 Hrs  T(C): 36.6 (09 Mar 2018 16:06), Max: 37.6 (09 Mar 2018 10:15)  T(F): 97.8 (09 Mar 2018 16:06), Max: 99.7 (09 Mar 2018 10:15)  HR: 83 (09 Mar 2018 16:06) (62 - 83)  BP: 124/77 (09 Mar 2018 15:33) (115/62 - 153/92)  BP(mean): --  RR: 18 (09 Mar 2018 16:06) (16 - 20)  SpO2: 100% (09 Mar 2018 16:06) (100% - 100%)

## 2018-03-09 NOTE — BEHAVIORAL HEALTH ASSESSMENT NOTE - HPI (INCLUDE ILLNESS QUALITY, SEVERITY, DURATION, TIMING, CONTEXT, MODIFYING FACTORS, ASSOCIATED SIGNS AND SYMPTOMS)
28 yoswm, domiciled with ex-fiance, works employed, non caretaker, PPH Borderline Personality Disorder, MDD recurrent severe, Anxiety,  polysubstance abuse,, multiple past SA by OD, multiple psych admission, last Sept 2016 h/o verbal aggression and anger and drug seeking behavior, in treatment at [facility] in [place] by Dr Gregg and therapist Jhonatan, [facility] Asthma, and a smoker, h/o Ulcers and cyclic vomiting syndrome, bib EMS after Pt called this am following intentional overdose after saúl  broke up yesterday.    Pt reports increasing depression and  anxiety in context of family discord and relational issues with saúl who broke up yesterday.  Pt also complaining of intentional OD by ingesting of 3 weeks worth of Gabapentin pills, 800 mg each, in attempt to end life, but when he woke this am, called EMS due to fear of bodily injury and was glad he did not die.  Pt denies h/o SA or current depression however is unreliable historian as per conflicting reports by therapist Jhonatan, who reports several SA and SIB.  RN from ED reports she spoke to Pt mother two days, when in ED for unrelated event requiring pain meds Mso4 and Dilaudid and mentioned he was planning a double suicide with his fidenys who is also depressed.  Mother had called [facility] who came to evaluate Pt, who denied the claim.  Mother also reported Pt is on a "registry" which prohibits him from having narcotics dispensed.  Pt is currently c/o "kidney pain and demanding pain med in ED which was declined due to negative finding in CT scan and normal blood work.  Pt also c/o vomiting blood and having an ulcer.  He denies drug use including marijuana, claiming his last use was 1 mo ago and small amt, (tox is pos Benzo and THC).  Spoke with Jhonatan from [facility], Pt therapist who feels Pt is a high risk of self harm due to past SA and impulsive behavior and limited coping skills.  In addition he is  drug seeking and they are planning on transferring care to [place], where they do random toxicology as they feel he has significant drug use problem.  Pt asked for Dilaudid and Morphine multiple times while in ED and was denied.  Meds were confirms with [facility] and are consistent with Pt reports.  Pt last rx for meds was Feb 29th (see list for meds below).      Pt seen today with over productive over inclusive speech.  Pt involved with impulsive  risk taking behavior with little regard for consequences and was boasting " it was amazing even the ER doctors were impressed  I took so much of the Neurontin that I was not expected to survive and here I am,.... wow." Pt grinning from ear to ear " I'm going to eat and then I will come back to tell you some more." Pt with no remorse. "So how long do I need to be here , a day or two, you know I thinking I can put in a three day letter by now ?" Pt continues to be overly familiar and intrusive.  Spoke with pt about mood stabilizing medication and he refused lithium and depakote "make me too dull." Pt not appearing sad and was even claiming "bored".

## 2018-03-09 NOTE — BEHAVIORAL HEALTH ASSESSMENT NOTE - NSBHCHARTREVIEWLAB_PSY_A_CORE FT
LIVER FUNCTIONS - ( 09 Mar 2018 10:36 )  Alb: 4.3 g/dL / Pro: 7.6 gm/dL / ALK PHOS: 72 U/L / ALT: 97 U/L / AST: 39 U/L / GGT: x           03-09    142  |  109<H>  |  14  ----------------------------<  94  3.9   |  24  |  1.02    Ca    8.9      09 Mar 2018 10:36    TPro  7.6  /  Alb  4.3  /  TBili  1.0  /  DBili  x   /  AST  39<H>  /  ALT  97<H>  /  AlkPhos  72  03-09    CBC Full  -  ( 09 Mar 2018 10:36 )  WBC Count : 6.44 K/uL  Hemoglobin : 14.5 g/dL  Hematocrit : 42.0 %  Platelet Count - Automated : 212 K/uL  Mean Cell Volume : 84.7 fl  Mean Cell Hemoglobin : 29.2 pg  Mean Cell Hemoglobin Concentration : 34.5 gm/dL  Auto Neutrophil # : 3.95 K/uL  Auto Lymphocyte # : 1.77 K/uL  Auto Monocyte # : 0.61 K/uL  Auto Eosinophil # : 0.09 K/uL  Auto Basophil # : 0.01 K/uL  Auto Neutrophil % : 61.2 %  Auto Lymphocyte % : 27.5 %  Auto Monocyte % : 9.5 %  Auto Eosinophil % : 1.4 %  Auto Basophil % : 0.2 %

## 2018-03-09 NOTE — ED ADULT TRIAGE NOTE - CHIEF COMPLAINT QUOTE
Pt reported taking 65g of neurontin in purposeful OD last night after break up with significant other as per EMS. PT accompanied by CECILY Thompson.  MD and RN at bedside.

## 2018-03-09 NOTE — ED ADULT NURSE NOTE - OBJECTIVE STATEMENT
Pt biba for si attempt. Pt took 65g Neurontin in attempt to kill himself.  Pt stated his gf left him and he wanted to end his life. Pt biba for si attempt. Pt took 65g Neurontin in attempt to kill himself at 10p.  Pt stated his gf left him and he wanted to end his life.

## 2018-03-09 NOTE — ED BEHAVIORAL HEALTH ASSESSMENT NOTE - DESCRIPTION (FIRST USE, LAST USE, QUANTITY, FREQUENCY, DURATION)
trying to quit.  "Today is last day". denies tox pos denies abuse but reports estrangement from family due to his past drug abuse Klonopin rx, tox pos benzo

## 2018-03-09 NOTE — ED BEHAVIORAL HEALTH ASSESSMENT NOTE - HPI (INCLUDE ILLNESS QUALITY, SEVERITY, DURATION, TIMING, CONTEXT, MODIFYING FACTORS, ASSOCIATED SIGNS AND SYMPTOMS)
28 yoswm, domiciled with ex-fiance, works employed, non caretaker, PPH Borderline Personality Disorder, MDD recurrent severe, Anxiety,  polysubstance abuse,, multiple past SA by OD, multiple psych admission, last Sept 2016 h/o verbal aggression and anger and drug seeking behavior, in treatment at [facility] in [place] by Dr Gregg and therapist Jhonatan, [facility] Asthma, and a smoker, h/o Ulcers and cyclic vomiting syndrome, bib EMS after Pt called this am following intentional overdose after saúl  broke up yesterday.    Pt reports increasing depression and  anxiety in context of family discord and relational issues with saúl who broke up yesterday.  Pt also complaining of intentional OD by ingesting of 3 weeks worth of Gabapentin pills, 800 mg each, in attempt to end life, but when he woke this am, called EMS due to fear of bodily injury and was glad he did not die.  Pt denies h/o SA or current depression however is unreliable historian as per conflicting reports by therapist Jhonatan, who reports several SA and SIB.  RN from ED reports she spoke to Pt mother two days, when in ED for unrelated event requiring pain meds Mso4 and Dilaudid and mentioned he was planning a double suicide with his fidenys who is also depressed.  Mother had called [facility] who came to evaluate Pt, who denied the claim.  Mother also reported Pt is on a "registry" which prohibits him from having narcotics dispensed.  Pt is currently c/o "kidney pain and demanding pain med in ED which was declined due to negative finding in CT scan and normal blood work.  Pt also c/o vomiting blood and having an ulcer.  He denies drug use including marijuana, claiming his last use was 1 mo ago and small amt, (tox is pos Benzo and THC).  Spoke with Jhonatan from [facility], Pt therapist who feels Pt is a high risk of self harm due to past SA and impulsive behavior and limited coping skills.  In addition he is  drug seeking and they are planning on transferring care to [place], where they do random toxicology as they feel but has significant drug use problem.  Pt asked for Dilaudid and Morphine multiple times while in ED and was denied.  Meds were confirms with [facility] and are consistent with Pt reports.  Pt last rx for meds was Feb 29th (see list for meds below).

## 2018-03-09 NOTE — ED PROVIDER NOTE - MEDICAL DECISION MAKING DETAILS
Cleared by toxicology.  Pt demanding dilaudid.  Will not give narcotics.  IVF and toradol given.  Seen by psychiatry, admit for SI.

## 2018-03-09 NOTE — ED PROVIDER NOTE - PROGRESS NOTE DETAILS
Monika SP: Dr. Olivares spoke with toxicology who recommends labs and observation for CNS depression.

## 2018-03-09 NOTE — ED PROVIDER NOTE - OBJECTIVE STATEMENT
27 y/o male with PMHx of depression, anxiety, cyclic vomiting syndrome presents to the ED s/p overdose on 65g gabapentin at 6pm last night in an attempted suicide. Pt states he attempted suicide due to a break up with his fiance yesterday and family abandonment in the last 10 years. Denies ingestion of other medications last night with gabapentin. Claims no drug use or ETOH use. On Seroquel 200mg 1x a day, Fluoxetine 20mg 1x a day in the am, Omeprazole 40 mg 1 x a day. Full HPI unobtainable due to anxiety and agitation due to overdose.

## 2018-03-09 NOTE — BEHAVIORAL HEALTH ASSESSMENT NOTE - DESCRIPTION (FIRST USE, LAST USE, QUANTITY, FREQUENCY, DURATION)
denies tox pos denies abuse but reports estrangement from family due to his past drug abuse Klonopin rx, tox pos benzo trying to quit.  "Today is last day".

## 2018-03-09 NOTE — ED BEHAVIORAL HEALTH ASSESSMENT NOTE - SUMMARY
28 yoswm, PPH Borderline Personality Disorder, MDD, polysubstance abuse, multiple prior psycy admission and SA by OD, bib EMS called by self this am after taking OD last night at 65 Gm or Gabapentin.  Pt denies active SI or plan, states is relieved he woke up but now is in pain to kidneys and is seeking pain meds.  Pt is unreliable historian, but per collaterals, Pt is high risk of SIB and reported to mother he had a double suicide pack will fiance this week and was evaluated by the [facility], but released.  Pt is a potential danger to self and others and would recommend psych admission to observe behavior, monitor mood and meds eval.  Pt will be admitted to [facility] on Voluntary status to Dr Riggins.  Case reviewed with Dr Riggins.

## 2018-03-09 NOTE — ED PROVIDER NOTE - CONSTITUTIONAL, MLM
normal... Anxious appearing, well nourished, awake, alert, oriented to person, place, time/situation. Tearful.
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
## 2018-03-09 NOTE — ED BEHAVIORAL HEALTH ASSESSMENT NOTE - DESCRIPTION
Mood and affect WNL, denied depression or SIIP s/p intentional SA by OD 65 Gm of Gabapentin.  Pt is overinclusive, and circumstantial with somatic preoccupation and drug seeking behavior.  Pt denies AH/VH/Delusions, no evidence of a thought disorder or michelle, although reports his sleep in impaired and is prescribed Ambien 10 mg. single lives with ex-fiance, works as , [place] and is estranges from family cyclic vomiting syndrome, "ulcers"
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
## 2018-03-09 NOTE — ED BEHAVIORAL HEALTH ASSESSMENT NOTE - RISK ASSESSMENT
mod risk of self harm, chronic SIB and drug seeking.  Pt has therapeutic support but noncompliant with tx and limited insight.

## 2018-03-10 ENCOUNTER — EMERGENCY (EMERGENCY)
Facility: HOSPITAL | Age: 28
LOS: 0 days | Discharge: HOME | End: 2018-03-10
Attending: FAMILY MEDICINE | Admitting: FAMILY MEDICINE
Payer: MEDICAID

## 2018-03-10 VITALS
RESPIRATION RATE: 16 BRPM | TEMPERATURE: 100 F | HEART RATE: 90 BPM | OXYGEN SATURATION: 99 % | SYSTOLIC BLOOD PRESSURE: 100 MMHG | DIASTOLIC BLOOD PRESSURE: 72 MMHG

## 2018-03-10 VITALS
TEMPERATURE: 98 F | RESPIRATION RATE: 15 BRPM | DIASTOLIC BLOOD PRESSURE: 74 MMHG | SYSTOLIC BLOOD PRESSURE: 124 MMHG | WEIGHT: 134.92 LBS | HEART RATE: 47 BPM | HEIGHT: 68 IN | OXYGEN SATURATION: 100 %

## 2018-03-10 DIAGNOSIS — F32.9 MAJOR DEPRESSIVE DISORDER, SINGLE EPISODE, UNSPECIFIED: ICD-10-CM

## 2018-03-10 DIAGNOSIS — R11.10 VOMITING, UNSPECIFIED: ICD-10-CM

## 2018-03-10 LAB
ALBUMIN SERPL ELPH-MCNC: 4.5 G/DL — SIGNIFICANT CHANGE UP (ref 3.3–5)
ALP SERPL-CCNC: 73 U/L — SIGNIFICANT CHANGE UP (ref 40–120)
ALT FLD-CCNC: 89 U/L — HIGH (ref 12–78)
ANION GAP SERPL CALC-SCNC: 11 MMOL/L — SIGNIFICANT CHANGE UP (ref 5–17)
AST SERPL-CCNC: 35 U/L — SIGNIFICANT CHANGE UP (ref 15–37)
BASE EXCESS BLDV CALC-SCNC: 2.2 MMOL/L — HIGH (ref -2–2)
BILIRUB SERPL-MCNC: 1.2 MG/DL — SIGNIFICANT CHANGE UP (ref 0.2–1.2)
BUN SERPL-MCNC: 8 MG/DL — SIGNIFICANT CHANGE UP (ref 7–23)
CALCIUM SERPL-MCNC: 9.1 MG/DL — SIGNIFICANT CHANGE UP (ref 8.5–10.1)
CHLORIDE SERPL-SCNC: 105 MMOL/L — SIGNIFICANT CHANGE UP (ref 96–108)
CHOLEST SERPL-MCNC: 148 MG/DL — SIGNIFICANT CHANGE UP (ref 10–199)
CO2 SERPL-SCNC: 23 MMOL/L — SIGNIFICANT CHANGE UP (ref 22–31)
CREAT SERPL-MCNC: 0.95 MG/DL — SIGNIFICANT CHANGE UP (ref 0.5–1.3)
ESTIMATED AVERAGE GLUCOSE: 94 MG/DL — SIGNIFICANT CHANGE UP (ref 68–114)
GLUCOSE SERPL-MCNC: 97 MG/DL — SIGNIFICANT CHANGE UP (ref 70–99)
HBA1C BLD-MCNC: 4.9 % — SIGNIFICANT CHANGE UP (ref 4–5.6)
HCO3 BLDV-SCNC: 24 MMOL/L — SIGNIFICANT CHANGE UP (ref 21–29)
HCT VFR BLD CALC: 38.9 % — LOW (ref 39–50)
HDLC SERPL-MCNC: 35 MG/DL — LOW (ref 40–125)
HGB BLD-MCNC: 14.1 G/DL — SIGNIFICANT CHANGE UP (ref 13–17)
LIPID PNL WITH DIRECT LDL SERPL: 95 MG/DL — SIGNIFICANT CHANGE UP
MCHC RBC-ENTMCNC: 29.9 PG — SIGNIFICANT CHANGE UP (ref 27–34)
MCHC RBC-ENTMCNC: 36.2 GM/DL — HIGH (ref 32–36)
MCV RBC AUTO: 82.6 FL — SIGNIFICANT CHANGE UP (ref 80–100)
NRBC # BLD: 0 /100 WBCS — SIGNIFICANT CHANGE UP (ref 0–0)
PCO2 BLDV: 29 MMHG — LOW (ref 35–50)
PH BLDV: 7.52 — HIGH (ref 7.35–7.45)
PLATELET # BLD AUTO: 217 K/UL — SIGNIFICANT CHANGE UP (ref 150–400)
PO2 BLDV: 31 MMHG — SIGNIFICANT CHANGE UP (ref 25–45)
POTASSIUM SERPL-MCNC: 3.8 MMOL/L — SIGNIFICANT CHANGE UP (ref 3.5–5.3)
POTASSIUM SERPL-SCNC: 3.8 MMOL/L — SIGNIFICANT CHANGE UP (ref 3.5–5.3)
PROT SERPL-MCNC: 7.6 GM/DL — SIGNIFICANT CHANGE UP (ref 6–8.3)
RBC # BLD: 4.71 M/UL — SIGNIFICANT CHANGE UP (ref 4.2–5.8)
RBC # FLD: 12.5 % — SIGNIFICANT CHANGE UP (ref 10.3–14.5)
SAO2 % BLDV: 65 % — LOW (ref 67–88)
SODIUM SERPL-SCNC: 139 MMOL/L — SIGNIFICANT CHANGE UP (ref 135–145)
TOTAL CHOLESTEROL/HDL RATIO MEASUREMENT: 4.2 RATIO — SIGNIFICANT CHANGE UP (ref 3.4–9.6)
TRIGL SERPL-MCNC: 90 MG/DL — SIGNIFICANT CHANGE UP (ref 10–149)
TSH SERPL-MCNC: 1.43 UU/ML — SIGNIFICANT CHANGE UP (ref 0.36–3.74)
WBC # BLD: 8.07 K/UL — SIGNIFICANT CHANGE UP (ref 3.8–10.5)
WBC # FLD AUTO: 8.07 K/UL — SIGNIFICANT CHANGE UP (ref 3.8–10.5)

## 2018-03-10 PROCEDURE — 99285 EMERGENCY DEPT VISIT HI MDM: CPT

## 2018-03-10 RX ORDER — SODIUM CHLORIDE 9 MG/ML
500 INJECTION, SOLUTION INTRAVENOUS
Qty: 0 | Refills: 0 | Status: DISCONTINUED | OUTPATIENT
Start: 2018-03-10 | End: 2018-03-13

## 2018-03-10 RX ORDER — PANTOPRAZOLE SODIUM 20 MG/1
40 TABLET, DELAYED RELEASE ORAL ONCE
Qty: 0 | Refills: 0 | Status: COMPLETED | OUTPATIENT
Start: 2018-03-10 | End: 2018-03-10

## 2018-03-10 RX ORDER — SODIUM CHLORIDE 9 MG/ML
500 INJECTION, SOLUTION INTRAVENOUS
Qty: 0 | Refills: 0 | Status: DISCONTINUED | OUTPATIENT
Start: 2018-03-10 | End: 2018-03-10

## 2018-03-10 RX ORDER — ONDANSETRON 8 MG/1
4 TABLET, FILM COATED ORAL EVERY 6 HOURS
Qty: 0 | Refills: 0 | Status: DISCONTINUED | OUTPATIENT
Start: 2018-03-10 | End: 2018-03-13

## 2018-03-10 RX ORDER — ONDANSETRON 8 MG/1
4 TABLET, FILM COATED ORAL ONCE
Qty: 0 | Refills: 0 | Status: COMPLETED | OUTPATIENT
Start: 2018-03-10 | End: 2018-03-10

## 2018-03-10 RX ORDER — PANTOPRAZOLE SODIUM 20 MG/1
40 TABLET, DELAYED RELEASE ORAL ONCE
Qty: 0 | Refills: 0 | Status: DISCONTINUED | OUTPATIENT
Start: 2018-03-10 | End: 2018-03-10

## 2018-03-10 RX ORDER — ONDANSETRON 8 MG/1
TABLET, FILM COATED ORAL
Qty: 0 | Refills: 0 | Status: DISCONTINUED | OUTPATIENT
Start: 2018-03-10 | End: 2018-03-13

## 2018-03-10 RX ORDER — ONDANSETRON 8 MG/1
4 TABLET, FILM COATED ORAL ONCE
Qty: 0 | Refills: 0 | Status: DISCONTINUED | OUTPATIENT
Start: 2018-03-10 | End: 2018-03-13

## 2018-03-10 RX ORDER — HYDROMORPHONE HYDROCHLORIDE 2 MG/ML
0.5 INJECTION INTRAMUSCULAR; INTRAVENOUS; SUBCUTANEOUS ONCE
Qty: 0 | Refills: 0 | Status: DISCONTINUED | OUTPATIENT
Start: 2018-03-10 | End: 2018-03-10

## 2018-03-10 RX ADMIN — Medication 75 MILLIGRAM(S): at 22:31

## 2018-03-10 RX ADMIN — Medication 2 MILLIGRAM(S): at 06:15

## 2018-03-10 RX ADMIN — ONDANSETRON 4 MILLIGRAM(S): 8 TABLET, FILM COATED ORAL at 16:07

## 2018-03-10 RX ADMIN — Medication 30 MILLILITER(S): at 06:45

## 2018-03-10 RX ADMIN — HYDROMORPHONE HYDROCHLORIDE 0.5 MILLIGRAM(S): 2 INJECTION INTRAMUSCULAR; INTRAVENOUS; SUBCUTANEOUS at 17:07

## 2018-03-10 RX ADMIN — HYDROMORPHONE HYDROCHLORIDE 0.5 MILLIGRAM(S): 2 INJECTION INTRAMUSCULAR; INTRAVENOUS; SUBCUTANEOUS at 17:20

## 2018-03-10 RX ADMIN — PANTOPRAZOLE SODIUM 40 MILLIGRAM(S): 20 TABLET, DELAYED RELEASE ORAL at 16:07

## 2018-03-10 RX ADMIN — GABAPENTIN 600 MILLIGRAM(S): 400 CAPSULE ORAL at 22:30

## 2018-03-10 RX ADMIN — Medication 2 MILLIGRAM(S): at 18:21

## 2018-03-10 RX ADMIN — ZOLPIDEM TARTRATE 5 MILLIGRAM(S): 10 TABLET ORAL at 22:32

## 2018-03-10 RX ADMIN — SODIUM CHLORIDE 250 MILLILITER(S): 9 INJECTION, SOLUTION INTRAVENOUS at 16:12

## 2018-03-10 NOTE — CONSULT NOTE ADULT - ASSESSMENT
Labs: reviewed labs and imaging studies    UDS+ve for THC and benzo.  WBC- wnl  CMP- mild elevation of ALT    CT angio abd- neg.    Impression:    1. Nausea and vomiting  2. Abdominal pain.  3. Drug over dose (gabapentin ? quantity)  4. H/o depression and anxiety  5. Cyclical vomiting    Patient has chronic narcotic dependence and is demanding IV Dilaudid for his symptoms.  Recommend starting him on IV protonix. 40 mg x 1now. Change to PO in am  Add zofran 4 mg IV x1 and zofran sublingual Q6hrs prn.  D5NS 500 ml IV bolus  Review of ED records stats that he has been cleared by Toxicology and hence was admitted to Psych.  Pain mangement consult,if needed per primary.    D/w Psychiatry service.  Patient was brought down to the ED and medications and IV were given.

## 2018-03-10 NOTE — ED ADULT NURSE NOTE - CAS DISCH CONDITION
Improved/as per MD Chavez pt OK to send pt to [address] [place] [address] w/ current vital sings, no medication at this time

## 2018-03-10 NOTE — ED PROVIDER NOTE - MEDICAL DECISION MAKING DETAILS
28 y-o Male sent from [facility] for IV Zofran and Protonix.  Plan: Zofran, Protonix. 28 y-o Male sent from [facility] with nausea and Vomiting for IV Zofran and Protonix. Hx of Opiod abuse  Plan: Orders done by [facility] attending.

## 2018-03-10 NOTE — ED ADULT NURSE NOTE - OBJECTIVE STATEMENT
Pt is in-pt on [facility] c/o nausea, vomiting and abd pain since this morning. Sent to ER for IVF, IV protonix and IV zofran.  Pt has NA at bedside, no order for 1:1 at this time.

## 2018-03-10 NOTE — ED PROVIDER NOTE - OBJECTIVE STATEMENT
28 y-o Male presents to the ED Sent from [facility] for IV Zofran and IV Protonix. Pt has been vomiting since 6am this morning, Was admitted to the Hospital for SI with plan yesterday at 7am. Pt c/o of nausea with vomiting and sever ABD pain. Pt states this has happened to him before came to ED, and was given Dilaudid for pain and sent home. 28 y-o Male with PMHX of Depression, Anxiety, Polysubstance abuse (including heroine) presents to the ED Sent from [facility] for IV Zofran and IV Protonix. Pt has been vomiting since 6am this morning, Was admitted to the Hospital for SI with plan yesterday at 7am. Pt c/o of nausea with vomiting and sever ABD pain. Pt states this has happened to him before, came to ED, and was given Dilaudid for pain and sent home x 4 days ago. 28 y-o Male with PMHX of Depression, Anxiety, Polysubstance abuse (including heroin) presents to the ED Sent from [facility] for IV Zofran and IV Protonix. Pt has been vomiting since 6am this morning, Was admitted to the Hospital for SI with plan yesterday at 7am. Pt c/o of nausea with vomiting and seveer ABD pain. Pt states this has happened to him before, came to ED, and was given Dilaudid for pain and sent home x 4 days ago.

## 2018-03-10 NOTE — CONSULT NOTE ADULT - SUBJECTIVE AND OBJECTIVE BOX
Chief complaint Patient is a 28 yrs old man with h/o depression, anxiety, polysubstance abuses, multiple suicidal attempts inthe past was brought in with c/o intentional overdose of gabapentin on friday evening. Patient is not a reliable historian and has h/o drug seeking behaviour per psych records. he mentioned that he swallowed a 'bunch of gabapentin' as he had relational issues with his fiance, with whom he lives. Next day morning he woke and called 911 as helt this medication would hurt him. While he was in the ED, his labs were WNL. As per ED documentation, toxicology was consulted and was cleared and hence was admitted to Psych given his suicidal ideation and attempt. Patient was c/o abdominal pain and vomiting since he got admitted and was asking for pain medication alexei IV dilaudid. His UDS came back positive for benzo and THC, although he denies using it. Hospitalist consulted fpr intractable nausea and vomiting. Patient has a h/o cyclical vomiting per chart. At the time of my examination, patient was sitting in bed, had vomiting, but appeared comfortable and was requesting for dilaudid, as this was the only pain medication he can take. he had a Ct Abdomen, while in the ED and was negative  for any acute findings. 	      Past medical History:  Depression, anxiety  Polysubstance abuse  h/o 'ulcers'  Cyclical vomiting    Past surgical history: none    MEDICATIONS: Clonazepam, gabapentin, Zolpidem. Trazadone and fluoxetine        PAST MEDICAL/ SURGICAL HISTORY:   · Description	cyclic vomiting syndrome, "ulcers"	    	  	  	    FAMILY HISTORY: father bipolar        SOCIAL HISTORY:   Lives with his fiance. Not employed currently.    O/e    Vitals: BP : 105/70 mm Hg  RR -20 / min  O2 sats 96% on RA and temperature- 37.6 Chief complaint Patient is a 28 yrs old man with h/o depression, anxiety, polysubstance abuses, multiple suicidal attempts inthe past was brought in with c/o intentional overdose of gabapentin on friday evening. Patient is not a reliable historian and has h/o drug seeking behaviour per psych records. he mentioned that he swallowed a 'bunch of gabapentin' as he had relational issues with his fiance, with whom he lives. Next day morning he woke and called 911 as helt this medication would hurt him. While he was in the ED, his labs were WNL. As per ED documentation, toxicology was consulted and was cleared and hence was admitted to Psych given his suicidal ideation and attempt. Patient was c/o abdominal pain and vomiting since he got admitted and was asking for pain medication alexei IV dilaudid. His UDS came back positive for benzo and THC, although he denies using it. Hospitalist consulted fpr intractable nausea and vomiting. Patient has a h/o cyclical vomiting per chart. At the time of my examination, patient was sitting in bed, had vomiting, but appeared comfortable and was requesting for dilaudid, as this was the only pain medication he can take. he had a Ct Abdomen, while in the ED and was negative  for any acute findings. 	      Past medical History:  Depression, anxiety  Polysubstance abuse  h/o 'ulcers'  Cyclical vomiting    Past surgical history: none    MEDICATIONS: Clonazepam, gabapentin, Zolpidem. Trazadone and fluoxetine        PAST MEDICAL/ SURGICAL HISTORY:   · Description	cyclic vomiting syndrome, "ulcers"	    	  	  	    FAMILY HISTORY: father bipolar        SOCIAL HISTORY:   Lives with his fiance. Not employed currently.    O/e    Vitals: BP : 105/70 mm Hg  RR -20 / min  O2 sats 96% on RA and temperature- 37.6  HEENT: no icterus, neck swelling  Patient anxious and asking for pain medication for his abd discomfort  Chest: air enrty equal b/l. no ronchi or crackles  CVS: s1s2+, no s3s4 rub or murmers  Abd: soft, non tender. No organomegaly. BS+. Points towards epigastric tenderness  CNS: HMF - anxious. No gross FND  Extremities: warm, distal pulsations+

## 2018-03-10 NOTE — ED PROVIDER NOTE - PROGRESS NOTE DETAILS
Pt brought from . Jacqueline Philip assuming care and writing orders as  has no mediacal capabilities. Pt d/cd by hospitalist. Scott BRADEN

## 2018-03-10 NOTE — ED PROVIDER NOTE - CONSTITUTIONAL, MLM
normal... Ill appearing, awake, alert, oriented to person, place, time/situation, in mild distress secondary to pain.

## 2018-03-10 NOTE — ED ADULT NURSE REASSESSMENT NOTE - NS ED NURSE REASSESS COMMENT FT1
Pt being followed by Dr. Justina Philip - pt c/o abd pain and requesting dilaudid - Dr. Horn aware, does not want to give any pain medication at this time.

## 2018-03-11 RX ORDER — CHLORPROMAZINE HCL 10 MG
50 TABLET ORAL ONCE
Qty: 0 | Refills: 0 | Status: COMPLETED | OUTPATIENT
Start: 2018-03-11 | End: 2018-03-11

## 2018-03-11 RX ORDER — CLONAZEPAM 1 MG
1 TABLET ORAL EVERY 12 HOURS
Qty: 0 | Refills: 0 | Status: DISCONTINUED | OUTPATIENT
Start: 2018-03-11 | End: 2018-03-13

## 2018-03-11 RX ORDER — QUETIAPINE FUMARATE 200 MG/1
300 TABLET, FILM COATED ORAL AT BEDTIME
Qty: 0 | Refills: 0 | Status: DISCONTINUED | OUTPATIENT
Start: 2018-03-11 | End: 2018-03-13

## 2018-03-11 RX ADMIN — Medication 50 MILLIGRAM(S): at 14:37

## 2018-03-11 RX ADMIN — GABAPENTIN 600 MILLIGRAM(S): 400 CAPSULE ORAL at 08:12

## 2018-03-11 RX ADMIN — GABAPENTIN 600 MILLIGRAM(S): 400 CAPSULE ORAL at 13:06

## 2018-03-11 RX ADMIN — Medication 50 MILLIGRAM(S): at 09:58

## 2018-03-11 RX ADMIN — Medication 75 MILLIGRAM(S): at 20:45

## 2018-03-11 RX ADMIN — GABAPENTIN 600 MILLIGRAM(S): 400 CAPSULE ORAL at 20:45

## 2018-03-11 RX ADMIN — ZOLPIDEM TARTRATE 5 MILLIGRAM(S): 10 TABLET ORAL at 21:56

## 2018-03-11 RX ADMIN — QUETIAPINE FUMARATE 300 MILLIGRAM(S): 200 TABLET, FILM COATED ORAL at 20:45

## 2018-03-11 RX ADMIN — Medication 2 MILLIGRAM(S): at 08:12

## 2018-03-11 RX ADMIN — Medication 1 MILLIGRAM(S): at 20:45

## 2018-03-12 RX ORDER — CHLORPROMAZINE HCL 10 MG
50 TABLET ORAL ONCE
Qty: 0 | Refills: 0 | Status: COMPLETED | OUTPATIENT
Start: 2018-03-12 | End: 2018-03-12

## 2018-03-12 RX ADMIN — QUETIAPINE FUMARATE 300 MILLIGRAM(S): 200 TABLET, FILM COATED ORAL at 21:48

## 2018-03-12 RX ADMIN — Medication 50 MILLIGRAM(S): at 10:15

## 2018-03-12 RX ADMIN — Medication 1 MILLIGRAM(S): at 21:47

## 2018-03-12 RX ADMIN — Medication 50 MILLIGRAM(S): at 13:36

## 2018-03-12 RX ADMIN — ZOLPIDEM TARTRATE 5 MILLIGRAM(S): 10 TABLET ORAL at 21:47

## 2018-03-12 RX ADMIN — Medication 50 MILLIGRAM(S): at 19:19

## 2018-03-12 RX ADMIN — GABAPENTIN 600 MILLIGRAM(S): 400 CAPSULE ORAL at 13:02

## 2018-03-12 RX ADMIN — Medication 1 MILLIGRAM(S): at 09:18

## 2018-03-12 NOTE — PROGRESS NOTE BEHAVIORAL HEALTH - NS ED BHA MSE GENERAL APPEARANCE
No deformities present

## 2018-03-12 NOTE — PROGRESS NOTE BEHAVIORAL HEALTH - NSBHCHARTREVIEWLAB_PSY_A_CORE FT
LIVER FUNCTIONS - ( 09 Mar 2018 10:36 )  Alb: 4.3 g/dL / Pro: 7.6 gm/dL / ALK PHOS: 72 U/L / ALT: 97 U/L / AST: 39 U/L / GGT: x           03-09    142  |  109<H>  |  14  ----------------------------<  94  3.9   |  24  |  1.02    Ca    8.9      09 Mar 2018 10:36    TPro  7.6  /  Alb  4.3  /  TBili  1.0  /  DBili  x   /  AST  39<H>  /  ALT  97<H>  /  AlkPhos  72  03-09    CBC Full  -  ( 09 Mar 2018 10:36 )  WBC Count : 6.44 K/uL  Hemoglobin : 14.5 g/dL  Hematocrit : 42.0 %  Platelet Count - Automated : 212 K/uL  Mean Cell Volume : 84.7 fl  Mean Cell Hemoglobin : 29.2 pg  Mean Cell Hemoglobin Concentration : 34.5 gm/dL  Auto Neutrophil # : 3.95 K/uL  Auto Lymphocyte # : 1.77 K/uL  Auto Monocyte # : 0.61 K/uL  Auto Eosinophil # : 0.09 K/uL  Auto Basophil # : 0.01 K/uL  Auto Neutrophil % : 61.2 %  Auto Lymphocyte % : 27.5 %  Auto Monocyte % : 9.5 %  Auto Eosinophil % : 1.4 %  Auto Basophil % : 0.2 %
LIVER FUNCTIONS - ( 10 Mar 2018 13:48 )  Alb: 4.5 g/dL / Pro: 7.6 gm/dL / ALK PHOS: 73 U/L / ALT: 89 U/L / AST: 35 U/L / GGT: x           03-10    139  |  105  |  8   ----------------------------<  97  3.8   |  23  |  0.95    Ca    9.1      10 Mar 2018 13:48    TPro  7.6  /  Alb  4.5  /  TBili  1.2  /  DBili  x   /  AST  35  /  ALT  89<H>  /  AlkPhos  73  03-10    CBC Full  -  ( 10 Mar 2018 13:48 )  WBC Count : 8.07 K/uL  Hemoglobin : 14.1 g/dL  Hematocrit : 38.9 %  Platelet Count - Automated : 217 K/uL  Mean Cell Volume : 82.6 fl  Mean Cell Hemoglobin : 29.9 pg  Mean Cell Hemoglobin Concentration : 36.2 gm/dL  Auto Neutrophil # : x  Auto Lymphocyte # : x  Auto Monocyte # : x  Auto Eosinophil # : x  Auto Basophil # : x  Auto Neutrophil % : x  Auto Lymphocyte % : x  Auto Monocyte % : x  Auto Eosinophil % : x  Auto Basophil % : x
LIVER FUNCTIONS - ( 10 Mar 2018 13:48 )  Alb: 4.5 g/dL / Pro: 7.6 gm/dL / ALK PHOS: 73 U/L / ALT: 89 U/L / AST: 35 U/L / GGT: x           03-10    139  |  105  |  8   ----------------------------<  97  3.8   |  23  |  0.95    Ca    9.1      10 Mar 2018 13:48    TPro  7.6  /  Alb  4.5  /  TBili  1.2  /  DBili  x   /  AST  35  /  ALT  89<H>  /  AlkPhos  73  03-10    CBC Full  -  ( 10 Mar 2018 13:48 )  WBC Count : 8.07 K/uL  Hemoglobin : 14.1 g/dL  Hematocrit : 38.9 %  Platelet Count - Automated : 217 K/uL  Mean Cell Volume : 82.6 fl  Mean Cell Hemoglobin : 29.9 pg  Mean Cell Hemoglobin Concentration : 36.2 gm/dL  Auto Neutrophil # : x  Auto Lymphocyte # : x  Auto Monocyte # : x  Auto Eosinophil # : x  Auto Basophil # : x  Auto Neutrophil % : x  Auto Lymphocyte % : x  Auto Monocyte % : x  Auto Eosinophil % : x  Auto Basophil % : x

## 2018-03-12 NOTE — PROGRESS NOTE BEHAVIORAL HEALTH - AFFECT CONGRUENCE
Not congruent/Congruent
Congruent/Not congruent
Not congruent/Congruent
Congruent/Not congruent
Not congruent/Congruent

## 2018-03-12 NOTE — PROGRESS NOTE BEHAVIORAL HEALTH - NSBHCHARTREVIEWVS_PSY_A_CORE FT
Vital Signs Last 24 Hrs  T(C): 36.6 (09 Mar 2018 16:06), Max: 37.6 (09 Mar 2018 10:15)  T(F): 97.8 (09 Mar 2018 16:06), Max: 99.7 (09 Mar 2018 10:15)  HR: 83 (09 Mar 2018 16:06) (62 - 83)  BP: 124/77 (09 Mar 2018 15:33) (115/62 - 153/92)  BP(mean): --  RR: 18 (09 Mar 2018 16:06) (16 - 20)  SpO2: 100% (09 Mar 2018 16:06) (100% - 100%)
Vital Signs Last 24 Hrs  T(C): 37.6 (10 Mar 2018 18:10), Max: 38 (10 Mar 2018 17:20)  T(F): 99.7 (10 Mar 2018 18:10), Max: 100.4 (10 Mar 2018 17:20)  HR: 64 (10 Mar 2018 18:10) (47 - 90)  BP: 105/79 (10 Mar 2018 18:10) (100/72 - 130/74)  BP(mean): --  RR: 16 (10 Mar 2018 18:10) (15 - 16)  SpO2: 100% (10 Mar 2018 18:10) (99% - 100%)
Vital Signs Last 24 Hrs  T(C): 35.6 (12 Mar 2018 08:14), Max: 35.6 (12 Mar 2018 08:14)  T(F): 96.1 (12 Mar 2018 08:14), Max: 96.1 (12 Mar 2018 08:14)  HR: 66 (12 Mar 2018 08:14) (66 - 66)  BP: 130/77 (12 Mar 2018 08:14) (130/77 - 130/77)  BP(mean): --  RR: 14 (12 Mar 2018 08:14) (14 - 14)  SpO2: 100% (12 Mar 2018 08:14) (100% - 100%)

## 2018-03-12 NOTE — PROGRESS NOTE BEHAVIORAL HEALTH - SECONDARY DX2
Polysubstance abuse
Polysubstance abuse
Mood disorder
Borderline personality disorder
Suicide attempt by drug ingestion, subsequent encounter

## 2018-03-12 NOTE — PROGRESS NOTE BEHAVIORAL HEALTH - SECONDARY DX3
Borderline personality disorder
Suicide attempt by drug ingestion, subsequent encounter
Suicide attempt by drug ingestion, subsequent encounter

## 2018-03-12 NOTE — PROGRESS NOTE BEHAVIORAL HEALTH - PROBLEM SELECTOR PLAN 1
seroquel  300mg po daily for mood and affect stability
seroquel  300mg po daily for mood and affect stability

## 2018-03-12 NOTE — PROGRESS NOTE BEHAVIORAL HEALTH - PRIMARY DX
Mood disorder
Mood disorder
Suicide attempt by drug ingestion, subsequent encounter
Mood disorder
Mood disorder
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
## 2018-03-12 NOTE — PROGRESS NOTE BEHAVIORAL HEALTH - AXIS III
s/o OD Gabapentin 65 Gm

## 2018-03-12 NOTE — PROGRESS NOTE BEHAVIORAL HEALTH - NSBHADMITIPOBSFT_PSY_A_CORE
pt with impulsivity , frequent attempts to demand medication or to complain of symtoms to attempt medication
pt with overdose with neurontin
pt with periods of aggressive behavior both physical and verbal
pt with mood and affect lability , poor insight and motivation

## 2018-03-12 NOTE — PROGRESS NOTE BEHAVIORAL HEALTH - NSBHADMITDANGERSELF_PSY_A_CORE
suicidal ideation with plan and means
suicidal ideation with plan and means/suicidal behavior
suicidal behavior
suicidal behavior

## 2018-03-12 NOTE — PROGRESS NOTE BEHAVIORAL HEALTH - NSBHCHARTREVIEWINVESTIGATE_PSY_A_CORE FT
QRS axis to [] ° and NSR at a rate of [] BPM. There was no atrial enlargement. There was no ventricular hypertrophy. There were no ST-T changes and all intervals were normal.

## 2018-03-12 NOTE — PROGRESS NOTE BEHAVIORAL HEALTH - NSBHFUPINTERVALCCFT_PSY_A_CORE
"I have been vomiting and getting nauseated all day , cant eat."
"I'm still getting nausea"
I want the Seroquel started again
Pt is agitated and irritable , verbally aggressive
Pt with continue preoccupation with obtaining pain medication and opiates

## 2018-03-12 NOTE — PROGRESS NOTE BEHAVIORAL HEALTH - SECONDARY DX1
Borderline personality disorder
Borderline personality disorder
Polysubstance abuse

## 2018-03-12 NOTE — PROGRESS NOTE BEHAVIORAL HEALTH - PROBLEM SELECTOR PROBLEM 4
Suicide attempt by drug ingestion, subsequent encounter
Suicide attempt by drug ingestion, subsequent encounter

## 2018-03-13 ENCOUNTER — INPATIENT (INPATIENT)
Facility: HOSPITAL | Age: 28
LOS: 1 days | Discharge: HOME | End: 2018-03-15
Attending: HOSPITALIST | Admitting: HOSPITALIST
Payer: MEDICAID

## 2018-03-13 VITALS
DIASTOLIC BLOOD PRESSURE: 69 MMHG | OXYGEN SATURATION: 92 % | SYSTOLIC BLOOD PRESSURE: 124 MMHG | RESPIRATION RATE: 18 BRPM | TEMPERATURE: 99 F | HEART RATE: 62 BPM

## 2018-03-13 VITALS
HEART RATE: 112 BPM | DIASTOLIC BLOOD PRESSURE: 67 MMHG | RESPIRATION RATE: 14 BRPM | SYSTOLIC BLOOD PRESSURE: 106 MMHG | OXYGEN SATURATION: 100 % | TEMPERATURE: 100 F

## 2018-03-13 LAB
ADD ON TEST-SPECIMEN IN LAB: SIGNIFICANT CHANGE UP
ADD ON TEST-SPECIMEN IN LAB: SIGNIFICANT CHANGE UP
ALBUMIN SERPL ELPH-MCNC: 4.8 G/DL — SIGNIFICANT CHANGE UP (ref 3.3–5)
ALP SERPL-CCNC: 74 U/L — SIGNIFICANT CHANGE UP (ref 40–120)
ALT FLD-CCNC: 64 U/L — SIGNIFICANT CHANGE UP (ref 12–78)
AMYLASE P1 CFR SERPL: 29 U/L — SIGNIFICANT CHANGE UP (ref 25–115)
ANION GAP SERPL CALC-SCNC: 8 MMOL/L — SIGNIFICANT CHANGE UP (ref 5–17)
AST SERPL-CCNC: 36 U/L — SIGNIFICANT CHANGE UP (ref 15–37)
BASOPHILS # BLD AUTO: 0.02 K/UL — SIGNIFICANT CHANGE UP (ref 0–0.2)
BASOPHILS NFR BLD AUTO: 0.2 % — SIGNIFICANT CHANGE UP (ref 0–2)
BILIRUB SERPL-MCNC: 1.3 MG/DL — HIGH (ref 0.2–1.2)
BUN SERPL-MCNC: 5 MG/DL — LOW (ref 7–23)
CALCIUM SERPL-MCNC: 9.3 MG/DL — SIGNIFICANT CHANGE UP (ref 8.5–10.1)
CHLORIDE SERPL-SCNC: 99 MMOL/L — SIGNIFICANT CHANGE UP (ref 96–108)
CO2 SERPL-SCNC: 31 MMOL/L — SIGNIFICANT CHANGE UP (ref 22–31)
CREAT SERPL-MCNC: 1.1 MG/DL — SIGNIFICANT CHANGE UP (ref 0.5–1.3)
EOSINOPHIL # BLD AUTO: 0.04 K/UL — SIGNIFICANT CHANGE UP (ref 0–0.5)
EOSINOPHIL NFR BLD AUTO: 0.5 % — SIGNIFICANT CHANGE UP (ref 0–6)
GLUCOSE SERPL-MCNC: 104 MG/DL — HIGH (ref 70–99)
HCT VFR BLD CALC: 43.9 % — SIGNIFICANT CHANGE UP (ref 39–50)
HGB BLD-MCNC: 15.5 G/DL — SIGNIFICANT CHANGE UP (ref 13–17)
IMM GRANULOCYTES NFR BLD AUTO: 0.4 % — SIGNIFICANT CHANGE UP (ref 0–1.5)
LACTATE SERPL-SCNC: 1.2 MMOL/L — SIGNIFICANT CHANGE UP (ref 0.7–2)
LACTATE SERPL-SCNC: 2 MMOL/L — SIGNIFICANT CHANGE UP (ref 0.7–2)
LIDOCAIN IGE QN: 194 U/L — SIGNIFICANT CHANGE UP (ref 73–393)
LYMPHOCYTES # BLD AUTO: 1.94 K/UL — SIGNIFICANT CHANGE UP (ref 1–3.3)
LYMPHOCYTES # BLD AUTO: 23.5 % — SIGNIFICANT CHANGE UP (ref 13–44)
MAGNESIUM SERPL-MCNC: 1.9 MG/DL — SIGNIFICANT CHANGE UP (ref 1.6–2.6)
MCHC RBC-ENTMCNC: 29.8 PG — SIGNIFICANT CHANGE UP (ref 27–34)
MCHC RBC-ENTMCNC: 35.3 GM/DL — SIGNIFICANT CHANGE UP (ref 32–36)
MCV RBC AUTO: 84.3 FL — SIGNIFICANT CHANGE UP (ref 80–100)
MONOCYTES # BLD AUTO: 0.7 K/UL — SIGNIFICANT CHANGE UP (ref 0–0.9)
MONOCYTES NFR BLD AUTO: 8.5 % — SIGNIFICANT CHANGE UP (ref 2–14)
NEUTROPHILS # BLD AUTO: 5.53 K/UL — SIGNIFICANT CHANGE UP (ref 1.8–7.4)
NEUTROPHILS NFR BLD AUTO: 66.9 % — SIGNIFICANT CHANGE UP (ref 43–77)
NRBC # BLD: 0 /100 WBCS — SIGNIFICANT CHANGE UP (ref 0–0)
PHOSPHATE SERPL-MCNC: 3 MG/DL — SIGNIFICANT CHANGE UP (ref 2.5–4.5)
PLATELET # BLD AUTO: 230 K/UL — SIGNIFICANT CHANGE UP (ref 150–400)
POTASSIUM SERPL-MCNC: 3.1 MMOL/L — LOW (ref 3.5–5.3)
POTASSIUM SERPL-SCNC: 3.1 MMOL/L — LOW (ref 3.5–5.3)
PROT SERPL-MCNC: 8.1 GM/DL — SIGNIFICANT CHANGE UP (ref 6–8.3)
RBC # BLD: 5.21 M/UL — SIGNIFICANT CHANGE UP (ref 4.2–5.8)
RBC # FLD: 13 % — SIGNIFICANT CHANGE UP (ref 10.3–14.5)
SODIUM SERPL-SCNC: 138 MMOL/L — SIGNIFICANT CHANGE UP (ref 135–145)
WBC # BLD: 8.26 K/UL — SIGNIFICANT CHANGE UP (ref 3.8–10.5)
WBC # FLD AUTO: 8.26 K/UL — SIGNIFICANT CHANGE UP (ref 3.8–10.5)

## 2018-03-13 PROCEDURE — 74177 CT ABD & PELVIS W/CONTRAST: CPT | Mod: 26

## 2018-03-13 PROCEDURE — 93010 ELECTROCARDIOGRAM REPORT: CPT

## 2018-03-13 RX ORDER — OMEPRAZOLE 10 MG/1
0 CAPSULE, DELAYED RELEASE ORAL
Qty: 0 | Refills: 0 | COMMUNITY

## 2018-03-13 RX ORDER — ACETAMINOPHEN 500 MG
650 TABLET ORAL EVERY 6 HOURS
Qty: 0 | Refills: 0 | Status: DISCONTINUED | OUTPATIENT
Start: 2018-03-13 | End: 2018-03-15

## 2018-03-13 RX ORDER — ONDANSETRON 8 MG/1
4 TABLET, FILM COATED ORAL EVERY 4 HOURS
Qty: 0 | Refills: 0 | Status: DISCONTINUED | OUTPATIENT
Start: 2018-03-13 | End: 2018-03-15

## 2018-03-13 RX ORDER — CLONAZEPAM 1 MG
0.5 TABLET ORAL
Qty: 0 | Refills: 0 | Status: DISCONTINUED | OUTPATIENT
Start: 2018-03-13 | End: 2018-03-13

## 2018-03-13 RX ORDER — QUETIAPINE FUMARATE 200 MG/1
1 TABLET, FILM COATED ORAL
Qty: 0 | Refills: 0 | COMMUNITY

## 2018-03-13 RX ORDER — TRAZODONE HCL 50 MG
0.5 TABLET ORAL
Qty: 0 | Refills: 0 | COMMUNITY
Start: 2018-03-13

## 2018-03-13 RX ORDER — CLONAZEPAM 1 MG
1 TABLET ORAL
Qty: 0 | Refills: 0 | COMMUNITY
Start: 2018-03-13

## 2018-03-13 RX ORDER — FLUOXETINE HCL 10 MG
1 CAPSULE ORAL
Qty: 0 | Refills: 0 | COMMUNITY
Start: 2018-03-13

## 2018-03-13 RX ORDER — SUCRALFATE 1 G
1 TABLET ORAL EVERY 6 HOURS
Qty: 0 | Refills: 0 | Status: DISCONTINUED | OUTPATIENT
Start: 2018-03-13 | End: 2018-03-15

## 2018-03-13 RX ORDER — PANTOPRAZOLE SODIUM 20 MG/1
40 TABLET, DELAYED RELEASE ORAL
Qty: 0 | Refills: 0 | COMMUNITY
Start: 2018-03-13

## 2018-03-13 RX ORDER — GABAPENTIN 400 MG/1
0 CAPSULE ORAL
Qty: 0 | Refills: 0 | COMMUNITY

## 2018-03-13 RX ORDER — SODIUM CHLORIDE 9 MG/ML
1000 INJECTION INTRAMUSCULAR; INTRAVENOUS; SUBCUTANEOUS ONCE
Qty: 0 | Refills: 0 | Status: DISCONTINUED | OUTPATIENT
Start: 2018-03-13 | End: 2018-03-13

## 2018-03-13 RX ORDER — ZOLPIDEM TARTRATE 10 MG/1
5 TABLET ORAL AT BEDTIME
Qty: 0 | Refills: 0 | Status: DISCONTINUED | OUTPATIENT
Start: 2018-03-13 | End: 2018-03-13

## 2018-03-13 RX ORDER — FLUOXETINE HCL 10 MG
0 CAPSULE ORAL
Qty: 0 | Refills: 0 | COMMUNITY

## 2018-03-13 RX ORDER — SENNA PLUS 8.6 MG/1
2 TABLET ORAL AT BEDTIME
Qty: 0 | Refills: 0 | Status: DISCONTINUED | OUTPATIENT
Start: 2018-03-13 | End: 2018-03-15

## 2018-03-13 RX ORDER — ONDANSETRON 8 MG/1
4 TABLET, FILM COATED ORAL EVERY 6 HOURS
Qty: 0 | Refills: 0 | Status: DISCONTINUED | OUTPATIENT
Start: 2018-03-13 | End: 2018-03-15

## 2018-03-13 RX ORDER — SODIUM CHLORIDE 9 MG/ML
0 INJECTION INTRAMUSCULAR; INTRAVENOUS; SUBCUTANEOUS
Qty: 0 | Refills: 0 | COMMUNITY
Start: 2018-03-13

## 2018-03-13 RX ORDER — QUETIAPINE FUMARATE 200 MG/1
300 TABLET, FILM COATED ORAL AT BEDTIME
Qty: 0 | Refills: 0 | Status: DISCONTINUED | OUTPATIENT
Start: 2018-03-13 | End: 2018-03-13

## 2018-03-13 RX ORDER — POTASSIUM CHLORIDE 20 MEQ
10 PACKET (EA) ORAL
Qty: 0 | Refills: 0 | Status: COMPLETED | OUTPATIENT
Start: 2018-03-13 | End: 2018-03-13

## 2018-03-13 RX ORDER — GABAPENTIN 400 MG/1
1 CAPSULE ORAL
Qty: 0 | Refills: 0 | COMMUNITY
Start: 2018-03-13

## 2018-03-13 RX ORDER — KETOROLAC TROMETHAMINE 30 MG/ML
15 SYRINGE (ML) INJECTION EVERY 6 HOURS
Qty: 0 | Refills: 0 | Status: DISCONTINUED | OUTPATIENT
Start: 2018-03-13 | End: 2018-03-14

## 2018-03-13 RX ORDER — CLONAZEPAM 1 MG
0.5 TABLET ORAL
Qty: 0 | Refills: 0 | Status: DISCONTINUED | OUTPATIENT
Start: 2018-03-13 | End: 2018-03-15

## 2018-03-13 RX ORDER — NICOTINE POLACRILEX 2 MG
2 GUM BUCCAL
Qty: 0 | Refills: 0 | Status: DISCONTINUED | OUTPATIENT
Start: 2018-03-13 | End: 2018-03-15

## 2018-03-13 RX ORDER — PANTOPRAZOLE SODIUM 20 MG/1
40 TABLET, DELAYED RELEASE ORAL ONCE
Qty: 0 | Refills: 0 | Status: DISCONTINUED | OUTPATIENT
Start: 2018-03-13 | End: 2018-03-13

## 2018-03-13 RX ORDER — SUCRALFATE 1 G
1 TABLET ORAL ONCE
Qty: 0 | Refills: 0 | Status: DISCONTINUED | OUTPATIENT
Start: 2018-03-13 | End: 2018-03-13

## 2018-03-13 RX ORDER — DOCUSATE SODIUM 100 MG
100 CAPSULE ORAL THREE TIMES A DAY
Qty: 0 | Refills: 0 | Status: DISCONTINUED | OUTPATIENT
Start: 2018-03-13 | End: 2018-03-13

## 2018-03-13 RX ORDER — GABAPENTIN 400 MG/1
400 CAPSULE ORAL THREE TIMES A DAY
Qty: 0 | Refills: 0 | Status: DISCONTINUED | OUTPATIENT
Start: 2018-03-13 | End: 2018-03-15

## 2018-03-13 RX ORDER — SODIUM CHLORIDE 9 MG/ML
500 INJECTION, SOLUTION INTRAVENOUS
Qty: 0 | Refills: 0 | COMMUNITY
Start: 2018-03-13

## 2018-03-13 RX ORDER — ACETAMINOPHEN 500 MG
1000 TABLET ORAL ONCE
Qty: 0 | Refills: 0 | Status: COMPLETED | OUTPATIENT
Start: 2018-03-13 | End: 2018-03-13

## 2018-03-13 RX ORDER — FLUOXETINE HCL 10 MG
10 CAPSULE ORAL DAILY
Qty: 0 | Refills: 0 | Status: DISCONTINUED | OUTPATIENT
Start: 2018-03-14 | End: 2018-03-15

## 2018-03-13 RX ORDER — QUETIAPINE FUMARATE 200 MG/1
0 TABLET, FILM COATED ORAL
Qty: 0 | Refills: 0 | COMMUNITY

## 2018-03-13 RX ORDER — ACETAMINOPHEN 500 MG
1000 TABLET ORAL ONCE
Qty: 0 | Refills: 0 | Status: DISCONTINUED | OUTPATIENT
Start: 2018-03-13 | End: 2018-03-13

## 2018-03-13 RX ORDER — SUCRALFATE 1 G
1 TABLET ORAL
Qty: 0 | Refills: 0 | COMMUNITY
Start: 2018-03-13

## 2018-03-13 RX ORDER — GABAPENTIN 400 MG/1
400 CAPSULE ORAL THREE TIMES A DAY
Qty: 0 | Refills: 0 | Status: DISCONTINUED | OUTPATIENT
Start: 2018-03-13 | End: 2018-03-13

## 2018-03-13 RX ORDER — TRAZODONE HCL 50 MG
0 TABLET ORAL
Qty: 0 | Refills: 0 | COMMUNITY
Start: 2018-03-13

## 2018-03-13 RX ORDER — PANTOPRAZOLE SODIUM 20 MG/1
40 TABLET, DELAYED RELEASE ORAL EVERY 12 HOURS
Qty: 0 | Refills: 0 | Status: DISCONTINUED | OUTPATIENT
Start: 2018-03-13 | End: 2018-03-14

## 2018-03-13 RX ORDER — DEXTROSE MONOHYDRATE, SODIUM CHLORIDE, AND POTASSIUM CHLORIDE 50; .745; 4.5 G/1000ML; G/1000ML; G/1000ML
1000 INJECTION, SOLUTION INTRAVENOUS
Qty: 0 | Refills: 0 | Status: DISCONTINUED | OUTPATIENT
Start: 2018-03-13 | End: 2018-03-15

## 2018-03-13 RX ORDER — TRAZODONE HCL 50 MG
75 TABLET ORAL AT BEDTIME
Qty: 0 | Refills: 0 | Status: DISCONTINUED | OUTPATIENT
Start: 2018-03-13 | End: 2018-03-15

## 2018-03-13 RX ORDER — QUETIAPINE FUMARATE 200 MG/1
200 TABLET, FILM COATED ORAL AT BEDTIME
Qty: 0 | Refills: 0 | Status: DISCONTINUED | OUTPATIENT
Start: 2018-03-13 | End: 2018-03-15

## 2018-03-13 RX ADMIN — ONDANSETRON 4 MILLIGRAM(S): 8 TABLET, FILM COATED ORAL at 21:49

## 2018-03-13 RX ADMIN — Medication 100 MILLIEQUIVALENT(S): at 21:37

## 2018-03-13 RX ADMIN — Medication 100 MILLIEQUIVALENT(S): at 19:54

## 2018-03-13 RX ADMIN — PANTOPRAZOLE SODIUM 40 MILLIGRAM(S): 20 TABLET, DELAYED RELEASE ORAL at 16:31

## 2018-03-13 RX ADMIN — Medication 0.5 MILLIGRAM(S): at 21:47

## 2018-03-13 RX ADMIN — ONDANSETRON 4 MILLIGRAM(S): 8 TABLET, FILM COATED ORAL at 16:36

## 2018-03-13 RX ADMIN — DEXTROSE MONOHYDRATE, SODIUM CHLORIDE, AND POTASSIUM CHLORIDE 100 MILLILITER(S): 50; .745; 4.5 INJECTION, SOLUTION INTRAVENOUS at 18:01

## 2018-03-13 RX ADMIN — Medication 100 MILLIEQUIVALENT(S): at 18:02

## 2018-03-13 RX ADMIN — Medication 1 MILLIGRAM(S): at 08:18

## 2018-03-13 RX ADMIN — Medication 400 MILLIGRAM(S): at 16:31

## 2018-03-13 RX ADMIN — Medication 75 MILLIGRAM(S): at 21:48

## 2018-03-13 RX ADMIN — QUETIAPINE FUMARATE 200 MILLIGRAM(S): 200 TABLET, FILM COATED ORAL at 21:48

## 2018-03-13 RX ADMIN — GABAPENTIN 600 MILLIGRAM(S): 400 CAPSULE ORAL at 08:18

## 2018-03-13 RX ADMIN — Medication 15 MILLIGRAM(S): at 18:56

## 2018-03-13 RX ADMIN — GABAPENTIN 400 MILLIGRAM(S): 400 CAPSULE ORAL at 21:47

## 2018-03-13 NOTE — H&P ADULT - NSHPPHYSICALEXAM_GEN_ALL_CORE
T(C): 37.9 (03-13-18 @ 09:16), Max: 37.9 (03-13-18 @ 09:16)  T(F): 100.2 (03-13-18 @ 09:16), Max: 100.2 (03-13-18 @ 09:16)  HR: 112 (03-13-18 @ 09:16) (112 - 112)  BP: 106/67 (03-13-18 @ 09:16) (106/67 - 106/67)  RR: 14 (03-13-18 @ 09:16) (14 - 14)  SpO2: 100% (03-13-18 @ 09:16) (100% - 100%)  Wt(kg): --    PHYSICAL EXAM:    Constitutional: NAD, awake and alert, in distress from pain  HEENT: PERR, EOMI, Normal Hearing, MMM  Neck: Soft and supple, No LAD, No JVD  Respiratory: Breath sounds are clear bilaterally, No wheezing, rales or rhonchi  Cardiovascular: S1 and S2, regular rate and rhythm, no Murmurs, gallops or rubs  Gastrointestinal: Bowel Sounds present, soft, epigastric tenderness with guarding, pt is not cooperative with exam  Extremities: No peripheral edema  Vascular: 2+ peripheral pulses  Neurological: A/O x 3, no focal deficits  Musculoskeletal: 5/5 strength b/l upper and lower extremities  Skin: No rashes  rectal : deferred, not indicated

## 2018-03-13 NOTE — DISCHARGE NOTE BEHAVIORAL HEALTH - NSBHDCCONDITIONFT_PSY_A_CORE
pt with return of another bout of nausea and vomiting inspire of use of Thorazine which was initially helpful.

## 2018-03-13 NOTE — DISCHARGE NOTE BEHAVIORAL HEALTH - PROVIDER TOKENS
FREE:[LAST:[[facility]],FIRST:[[facility]],PHONE:[(   )    -],FAX:[(   )    -]]

## 2018-03-13 NOTE — H&P ADULT - ASSESSMENT
29 y/o male with PMH of GERD, h/o PUD, Cycling vomiting syndrome,  depression, insomnia, anxiety, polysubstance abuse, multiple suicidal attempts and seeking behavior per psych records admitted to  and ready for discharge but pt requesting to be seen by the doctor because he reports severe epigastric pain 10/10, sharp, non-radiating, constant, associated with nausea, vomiting. Patient saying that he sees the blood in the vomit about table spoon and vomiting 10 times per day since he is in the hospital. He denies any diarrhea, no stool for 1 week, unable to eat, decreased amount of urine.   Denies CP, dyspnea, cough, HA, other sx.    * Severe epigastric abdominal pain with h/o GERD and PUD in the past suspected gastritis vs PUD  * Nausea, hematemesis  - repeat H/H  stat if bood noted RN ( pt reports blood but nobody witnessed the blood in the vomit)    - med - surg  - NPO  - IV fluids  - CT abd with PO/IV contrast  - IV PPI  - antiemetics  - pain management with tylenol and toradol IV in view of opioids abuse  - GI consult for endoscopic evaluation    * Hypokalemia due to GI loss  - replace IV, add K in IV fluids  - check Mg, Ph  - monitor     * Dehydration  - IV hydration  - npo for now    * Depression  - c/w SSRI  - fluoxetine and trazadone  - check EKG  - TSh wnl, check B12 level    * Anxiety  - c/w seroquel and clonazepam    * DVT proph - IPC 27 y/o male with PMH of GERD, h/o PUD, Cycling vomiting syndrome,  depression, insomnia, anxiety, polysubstance abuse, multiple suicidal attempts and seeking behavior per psych records admitted to  and ready for discharge but pt requesting to be seen by the doctor because he reports severe epigastric pain 10/10, sharp, non-radiating, constant, associated with nausea, vomiting. Patient saying that he sees the blood in the vomit about table spoon and vomiting 10 times per day since he is in the hospital. He denies any diarrhea, no stool for 1 week, unable to eat, decreased amount of urine.   Denies CP, dyspnea, cough, HA, other sx.    * Severe epigastric abdominal pain with h/o GERD and PUD in the past suspected gastritis vs PUD  * Nausea, hematemesis  * Cyclic vomiting syndrome  - repeat H/H  stat if bood noted RN ( pt reports blood but nobody witnessed the blood in the vomit)    - med - surg  - NPO  - IV fluids  - CT abd with PO/IV contrast  - IV PPI  - antiemetics  - pain management with tylenol and toradol IV in view of opioids abuse  - GI consult for endoscopic evaluation    * Hypokalemia due to GI loss  - replace IV, add K in IV fluids  - check Mg, Ph  - monitor     * Dehydration  - IV hydration  - npo for now    * Depression  - c/w SSRI  - fluoxetine and trazadone  - check EKG  - TSh wnl, check B12 level    * Anxiety  - c/w seroquel and clonazepam    * DVT proph - IPC

## 2018-03-13 NOTE — DISCHARGE NOTE BEHAVIORAL HEALTH - NSBHDCSUICPROTECTFT_PSY_A_CORE
pt with denial of any suicidal ideation or plan   Pt with the support of his girlfriend  pt is intelligent

## 2018-03-13 NOTE — DISCHARGE NOTE BEHAVIORAL HEALTH - NSBHDCSWCOMMENTSFT_PSY_A_CORE
Patient educated on relapse prevention, the importance of attending community self help such as AA in addition to outpatient treatment, and informed to not stop medications or tx unless told to do so by outpatient provider

## 2018-03-13 NOTE — DISCHARGE NOTE BEHAVIORAL HEALTH - HPI (INCLUDE ILLNESS QUALITY, SEVERITY, DURATION, TIMING, CONTEXT, MODIFYING FACTORS, ASSOCIATED SIGNS AND SYMPTOMS)
28 yoswm, domiciled with ex-fiance, works employed, non caretaker, PPH Borderline Personality Disorder, MDD recurrent severe, Anxiety,  polysubstance abuse,, multiple past SA by OD, multiple psych admission, last Sept 2016 h/o verbal aggression and anger and drug seeking behavior, in treatment at [facility] in [place] by Dr Gregg and therapist Jhonatan, [facility] Asthma, and a smoker, h/o Ulcers and cyclic vomiting syndrome, bib EMS after Pt called this am following intentional overdose after saúl  broke up yesterday.    Pt reports increasing depression and  anxiety in context of family discord and relational issues with saúl who broke up yesterday.  Pt also complaining of intentional OD by ingesting of 3 weeks worth of Gabapentin pills, 800 mg each, in attempt to end life, but when he woke this am, called EMS due to fear of bodily injury and was glad he did not die.  Pt denies h/o SA or current depression however is unreliable historian as per conflicting reports by therapist Jhonatan, who reports several SA and SIB.  RN from ED reports she spoke to Pt mother two days, when in ED for unrelated event requiring pain meds Mso4 and Dilaudid and mentioned he was planning a double suicide with his fidenys who is also depressed.  Mother had called [facility] who came to evaluate Pt, who denied the claim.  Mother also reported Pt is on a "registry" which prohibits him from having narcotics dispensed.  Pt is currently c/o "kidney pain and demanding pain med in ED which was declined due to negative finding in CT scan and normal blood work.  Pt also c/o vomiting blood and having an ulcer.  He denies drug use including marijuana, claiming his last use was 1 mo ago and small amt, (tox is pos Benzo and THC).  Spoke with Jhonatan from [facility], Pt therapist who feels Pt is a high risk of self harm due to past SA and impulsive behavior and limited coping skills.  In addition he is  drug seeking and they are planning on transferring care to [place], where they do random toxicology as they feel he has significant drug use problem.  Pt asked for Dilaudid and Morphine multiple times while in ED and was denied.  Meds were confirms with [facility] and are consistent with Pt reports.  Pt last rx for meds was Feb 29th (see list for meds below).      Pt seen today with over productive over inclusive speech.  Pt involved with impulsive  risk taking behavior with little regard for consequences and was boasting " it was amazing even the ER doctors were impressed  I took so much of the Neurontin that I was not expected to survive and here I am,.... wow." Pt grinning from ear to ear " I'm going to eat and then I will come back to tell you some more." Pt with no remorse. "So how long do I need to be here , a day or two, you know I thinking I can put in a three day letter by now ?" Pt continues to be overly familiar and intrusive.  Spoke with pt about mood stabilizing medication and he refused lithium and depakote "make me too dull." Pt not appearing sad and was even claiming "bored".

## 2018-03-13 NOTE — DISCHARGE NOTE BEHAVIORAL HEALTH - NSBHDCCRISISPLAN1FT_PSY_A_CORE
Call 911, go to the nearest emergency room, call my MD or therapist, call the crisis intervention number provided.

## 2018-03-13 NOTE — H&P ADULT - PMH
Anxiety    Cyclic vomiting syndrome    Depression    GERD (gastroesophageal reflux disease)    PUD (peptic ulcer disease)

## 2018-03-13 NOTE — DISCHARGE NOTE BEHAVIORAL HEALTH - REASON FOR ADMISSION
"I tried to kill myself..I took 3 weeks worth of pills, Gabapentin" "I took 3 weeks worth of pills, Gabapentin"

## 2018-03-13 NOTE — H&P ADULT - NSHPREVIEWOFSYSTEMS_GEN_ALL_CORE
REVIEW OF SYSTEMS:    CONSTITUTIONAL: No weakness, fevers or chills  EYES/ENT: No visual changes;  No vertigo or throat pain   NECK: No pain or stiffness  RESPIRATORY: No cough, wheezing, hemoptysis; No shortness of breath  CARDIOVASCULAR: No chest pain or palpitations  GASTROINTESTINAL: +  abdominal epigastric pain. +  nausea, + vomiting, +  hematemesis; No diarrhea or constipation. No melena or hematochezia.  GENITOURINARY: No dysuria, frequency or hematuria  NEUROLOGICAL: No numbness or weakness  SKIN: No itching, burning, rashes, or lesions   All other review of systems is negative unless indicated above.

## 2018-03-13 NOTE — DISCHARGE NOTE BEHAVIORAL HEALTH - NSTOBACCOREFERRAL_GEN_A_CS
Patient refused a referral at admission and discharge/Patient declined information Patient refused a referral at admission and discharge/Yes Patient declined information/Patient refused a referral at admission and discharge Patient refused referral at admission and discharge to smoking cessation/Patient declined information

## 2018-03-13 NOTE — DISCHARGE NOTE BEHAVIORAL HEALTH - NSBHDCMEDSFT_PSY_A_CORE
MEDICATIONS  (STANDING):  clonazePAM Tablet 0.5 milliGRAM(s) Oral two times a day  dextrose 5% + sodium chloride 0.9%. 500 milliLiter(s) (250 mL/Hr) IV Continuous <Continuous>  FLUoxetine 10 milliGRAM(s) Oral daily  gabapentin 600 milliGRAM(s) Oral three times a day  QUEtiapine 300 milliGRAM(s) Oral at bedtime  traZODone 75 milliGRAM(s) Oral at bedtime MEDICATIONS  (STANDING):  clonazePAM Tablet 0.5 milliGRAM(s) Oral two times a day  dextrose 5% + sodium chloride 0.9%. 500 milliLiter(s) (250 mL/Hr) IV Continuous <Continuous>  FLUoxetine 10 milliGRAM(s) Oral daily  gabapentin 600 milliGRAM(s) Oral three times a day  QUEtiapine 300 milliGRAM(s) Oral at bedtime  traZODone 75 milliGRAM(s) Oral at bedtime    pt to continue with his medication until directed by his MD to stop medications

## 2018-03-13 NOTE — DISCHARGE NOTE BEHAVIORAL HEALTH - CONDITIONS AT DISCHARGE
Patient alert, oriented x3 pleasant and cooperative.  Pt put in a 72 hour letter for discharge.  Pt denies any thoughts to harm himself or anyone else.  Pt sleeping and eating well.  Pt with a safe discharge plan including a place to live and aftercare appointments within 3-5 days.  Discharge plan reviewed with patient.  Pt given a copy of plan.

## 2018-03-13 NOTE — DISCHARGE NOTE BEHAVIORAL HEALTH - MEDICATION SUMMARY - MEDICATIONS TO STOP TAKING
I will STOP taking the medications listed below when I get home from the hospital:    ondansetron 4 mg oral tablet, disintegrating  -- 1 tab(s) by mouth 3 times a day prn nausea or vomiting

## 2018-03-13 NOTE — DISCHARGE NOTE BEHAVIORAL HEALTH - NSBHDCRESPONSEFT_PSY_A_CORE
MEDICATIONS  (STANDING):  clonazePAM Tablet 0.5 milliGRAM(s) Oral two times a day  dextrose 5% + sodium chloride 0.9%. 500 milliLiter(s) (250 mL/Hr) IV Continuous <Continuous>  FLUoxetine 10 milliGRAM(s) Oral daily  gabapentin 400 milliGRAM(s) Oral three times a day  QUEtiapine 300 milliGRAM(s) Oral at bedtime  traZODone 75 milliGRAM(s) Oral at bedtime    pt with frequent focus on abdominal pain and cyclic vomiting  Thorazine was helpful .  Pt with no suicide ideation or plan or attempt

## 2018-03-13 NOTE — DISCHARGE NOTE BEHAVIORAL HEALTH - MEDICATION SUMMARY - MEDICATIONS TO TAKE
I will START or STAY ON the medications listed below when I get home from the hospital:    clonazePAM 0.5 mg oral tablet  -- 1 tab(s) by mouth 2 times a day  -- Indication: For anxiety    gabapentin 400 mg oral capsule  -- 1 cap(s) by mouth 3 times a day  -- Indication: For Pain    FLUoxetine 10 mg oral capsule  -- 1 cap(s) by mouth once a day  -- Indication: For Mood disorder    traZODone  -- Indication: For insomnia    SEROquel 200 mg oral tablet  --  by mouth   -- Indication: For Mood disorder    Dextrose 5% with 0.9% NaCl intravenous solution  -- 500 milliliter(s) intravenous   -- Indication: For fluids    sodium chloride 0.9% injectable solution  --  injectable   -- Indication: For electrolyte    sucralfate 1 g oral tablet  -- 1 tab(s) by mouth once  -- Indication: For GI    pantoprazole 40 mg intravenous injection  -- 40 milligram(s) intravenous once  -- Indication: For Gi

## 2018-03-13 NOTE — H&P ADULT - HISTORY OF PRESENT ILLNESS
29 y/o male with PMH of GERD, h/o PUD, Cycling vomiting syndrome,  depression, insomnia, anxiety, polysubstance abuse, multiple suicidal attempts and seeking behavior per psych records admitted to  and ready for discharge but pt requesting to be seen by the doctor because he reports severe epigastric pain 10/10, sharp, non-radiating, constant, associated with nausea, vomiting. Patient saying that he sees the blood in the vomit about table spoon and vomiting 10 times per day since he is in the hospital. He denies any diarrhea, no stool for 1 week, unable to eat, decreased amount of urine.   Denies CP, dyspnea, cough, HA, other sx.

## 2018-03-13 NOTE — DISCHARGE NOTE BEHAVIORAL HEALTH - NSBHDCADDFT_PSY_A_CORE
03-10 Chol 148 LDL 95 HDL 35<L> Trig 90  03-10 [id number] 4.9  QRS axis to [] ° and NSR at a rate of [] BPM. There was no atrial enlargement. There was no ventricular hypertrophy. There were no ST-T changes and all intervals were normal.

## 2018-03-14 ENCOUNTER — RESULT REVIEW (OUTPATIENT)
Age: 28
End: 2018-03-14

## 2018-03-14 LAB
ALBUMIN SERPL ELPH-MCNC: 3.9 G/DL — SIGNIFICANT CHANGE UP (ref 3.3–5)
ALP SERPL-CCNC: 64 U/L — SIGNIFICANT CHANGE UP (ref 40–120)
ALT FLD-CCNC: 54 U/L — SIGNIFICANT CHANGE UP (ref 12–78)
ANION GAP SERPL CALC-SCNC: 7 MMOL/L — SIGNIFICANT CHANGE UP (ref 5–17)
AST SERPL-CCNC: 25 U/L — SIGNIFICANT CHANGE UP (ref 15–37)
BASOPHILS # BLD AUTO: 0.01 K/UL — SIGNIFICANT CHANGE UP (ref 0–0.2)
BASOPHILS NFR BLD AUTO: 0.2 % — SIGNIFICANT CHANGE UP (ref 0–2)
BILIRUB SERPL-MCNC: 1.4 MG/DL — HIGH (ref 0.2–1.2)
BUN SERPL-MCNC: 5 MG/DL — LOW (ref 7–23)
CALCIUM SERPL-MCNC: 8.6 MG/DL — SIGNIFICANT CHANGE UP (ref 8.5–10.1)
CHLORIDE SERPL-SCNC: 107 MMOL/L — SIGNIFICANT CHANGE UP (ref 96–108)
CO2 SERPL-SCNC: 27 MMOL/L — SIGNIFICANT CHANGE UP (ref 22–31)
CREAT SERPL-MCNC: 1.07 MG/DL — SIGNIFICANT CHANGE UP (ref 0.5–1.3)
EOSINOPHIL # BLD AUTO: 0.09 K/UL — SIGNIFICANT CHANGE UP (ref 0–0.5)
EOSINOPHIL NFR BLD AUTO: 1.5 % — SIGNIFICANT CHANGE UP (ref 0–6)
GLUCOSE SERPL-MCNC: 96 MG/DL — SIGNIFICANT CHANGE UP (ref 70–99)
HCT VFR BLD CALC: 41 % — SIGNIFICANT CHANGE UP (ref 39–50)
HGB BLD-MCNC: 14.1 G/DL — SIGNIFICANT CHANGE UP (ref 13–17)
IMM GRANULOCYTES NFR BLD AUTO: 0.2 % — SIGNIFICANT CHANGE UP (ref 0–1.5)
LYMPHOCYTES # BLD AUTO: 2.39 K/UL — SIGNIFICANT CHANGE UP (ref 1–3.3)
LYMPHOCYTES # BLD AUTO: 41.1 % — SIGNIFICANT CHANGE UP (ref 13–44)
MCHC RBC-ENTMCNC: 29.7 PG — SIGNIFICANT CHANGE UP (ref 27–34)
MCHC RBC-ENTMCNC: 34.4 GM/DL — SIGNIFICANT CHANGE UP (ref 32–36)
MCV RBC AUTO: 86.5 FL — SIGNIFICANT CHANGE UP (ref 80–100)
MONOCYTES # BLD AUTO: 0.5 K/UL — SIGNIFICANT CHANGE UP (ref 0–0.9)
MONOCYTES NFR BLD AUTO: 8.6 % — SIGNIFICANT CHANGE UP (ref 2–14)
NEUTROPHILS # BLD AUTO: 2.82 K/UL — SIGNIFICANT CHANGE UP (ref 1.8–7.4)
NEUTROPHILS NFR BLD AUTO: 48.4 % — SIGNIFICANT CHANGE UP (ref 43–77)
NRBC # BLD: 0 /100 WBCS — SIGNIFICANT CHANGE UP (ref 0–0)
OB PNL STL: NEGATIVE — SIGNIFICANT CHANGE UP
PLATELET # BLD AUTO: 201 K/UL — SIGNIFICANT CHANGE UP (ref 150–400)
POTASSIUM SERPL-MCNC: 3.4 MMOL/L — LOW (ref 3.5–5.3)
POTASSIUM SERPL-SCNC: 3.4 MMOL/L — LOW (ref 3.5–5.3)
PROT SERPL-MCNC: 6.7 GM/DL — SIGNIFICANT CHANGE UP (ref 6–8.3)
RBC # BLD: 4.74 M/UL — SIGNIFICANT CHANGE UP (ref 4.2–5.8)
RBC # FLD: 13.2 % — SIGNIFICANT CHANGE UP (ref 10.3–14.5)
SODIUM SERPL-SCNC: 141 MMOL/L — SIGNIFICANT CHANGE UP (ref 135–145)
WBC # BLD: 5.82 K/UL — SIGNIFICANT CHANGE UP (ref 3.8–10.5)
WBC # FLD AUTO: 5.82 K/UL — SIGNIFICANT CHANGE UP (ref 3.8–10.5)

## 2018-03-14 PROCEDURE — 88305 TISSUE EXAM BY PATHOLOGIST: CPT | Mod: 26

## 2018-03-14 PROCEDURE — 88312 SPECIAL STAINS GROUP 1: CPT | Mod: 26

## 2018-03-14 PROCEDURE — 93010 ELECTROCARDIOGRAM REPORT: CPT

## 2018-03-14 RX ORDER — HYDROMORPHONE HYDROCHLORIDE 2 MG/ML
1 INJECTION INTRAMUSCULAR; INTRAVENOUS; SUBCUTANEOUS EVERY 4 HOURS
Qty: 0 | Refills: 0 | Status: DISCONTINUED | OUTPATIENT
Start: 2018-03-14 | End: 2018-03-15

## 2018-03-14 RX ORDER — SUCRALFATE 1 G
1 TABLET ORAL
Qty: 0 | Refills: 0 | Status: DISCONTINUED | OUTPATIENT
Start: 2018-03-14 | End: 2018-03-15

## 2018-03-14 RX ORDER — PANTOPRAZOLE SODIUM 20 MG/1
40 TABLET, DELAYED RELEASE ORAL
Qty: 0 | Refills: 0 | Status: DISCONTINUED | OUTPATIENT
Start: 2018-03-14 | End: 2018-03-15

## 2018-03-14 RX ADMIN — Medication 0.5 MILLIGRAM(S): at 17:14

## 2018-03-14 RX ADMIN — DEXTROSE MONOHYDRATE, SODIUM CHLORIDE, AND POTASSIUM CHLORIDE 100 MILLILITER(S): 50; .745; 4.5 INJECTION, SOLUTION INTRAVENOUS at 06:23

## 2018-03-14 RX ADMIN — ONDANSETRON 4 MILLIGRAM(S): 8 TABLET, FILM COATED ORAL at 21:48

## 2018-03-14 RX ADMIN — Medication 0.5 MILLIGRAM(S): at 06:23

## 2018-03-14 RX ADMIN — HYDROMORPHONE HYDROCHLORIDE 1 MILLIGRAM(S): 2 INJECTION INTRAMUSCULAR; INTRAVENOUS; SUBCUTANEOUS at 12:19

## 2018-03-14 RX ADMIN — HYDROMORPHONE HYDROCHLORIDE 1 MILLIGRAM(S): 2 INJECTION INTRAMUSCULAR; INTRAVENOUS; SUBCUTANEOUS at 20:51

## 2018-03-14 RX ADMIN — DEXTROSE MONOHYDRATE, SODIUM CHLORIDE, AND POTASSIUM CHLORIDE 100 MILLILITER(S): 50; .745; 4.5 INJECTION, SOLUTION INTRAVENOUS at 15:35

## 2018-03-14 RX ADMIN — Medication 15 MILLIGRAM(S): at 06:29

## 2018-03-14 RX ADMIN — Medication 75 MILLIGRAM(S): at 21:48

## 2018-03-14 RX ADMIN — ONDANSETRON 4 MILLIGRAM(S): 8 TABLET, FILM COATED ORAL at 13:41

## 2018-03-14 RX ADMIN — GABAPENTIN 400 MILLIGRAM(S): 400 CAPSULE ORAL at 13:41

## 2018-03-14 RX ADMIN — GABAPENTIN 400 MILLIGRAM(S): 400 CAPSULE ORAL at 06:23

## 2018-03-14 RX ADMIN — Medication 1 GRAM(S): at 06:23

## 2018-03-14 RX ADMIN — PANTOPRAZOLE SODIUM 40 MILLIGRAM(S): 20 TABLET, DELAYED RELEASE ORAL at 17:15

## 2018-03-14 RX ADMIN — GABAPENTIN 400 MILLIGRAM(S): 400 CAPSULE ORAL at 21:48

## 2018-03-14 RX ADMIN — QUETIAPINE FUMARATE 200 MILLIGRAM(S): 200 TABLET, FILM COATED ORAL at 21:48

## 2018-03-14 RX ADMIN — Medication 1 GRAM(S): at 01:14

## 2018-03-14 RX ADMIN — Medication 10 MILLIGRAM(S): at 12:06

## 2018-03-14 RX ADMIN — Medication 1 GRAM(S): at 12:06

## 2018-03-14 RX ADMIN — HYDROMORPHONE HYDROCHLORIDE 1 MILLIGRAM(S): 2 INJECTION INTRAMUSCULAR; INTRAVENOUS; SUBCUTANEOUS at 16:16

## 2018-03-14 RX ADMIN — PANTOPRAZOLE SODIUM 40 MILLIGRAM(S): 20 TABLET, DELAYED RELEASE ORAL at 06:24

## 2018-03-14 RX ADMIN — ONDANSETRON 4 MILLIGRAM(S): 8 TABLET, FILM COATED ORAL at 07:54

## 2018-03-14 NOTE — CONSULT NOTE ADULT - SUBJECTIVE AND OBJECTIVE BOX
Patient is a 28y old  Male who presents with a chief complaint of severe epigastric pain (13 Mar 2018 14:14)    HPI:  29 y/o male with PMH of GERD, h/o PUD, Cycling vomiting syndrome,  depression, insomnia, anxiety, polysubstance abuse, multiple suicidal attempts and seeking behavior per [facility] records admitted to  and ready for discharge but pt requesting to be seen by the doctor because he reports severe epigastric pain 10/10, sharp, non-radiating, constant, associated with nausea, vomiting. Patient saying that he sees the blood in the vomit about table spoon and vomiting 10 times per day since he is in the hospital. He denies any diarrhea, no stool for 1 week, unable to eat, decreased amount of urine.   Denies CP, dyspnea, cough, HA, other sx. (13 Mar 2018 14:14)    3/14/2018-  Pt recent transfer from [facility], [facility] maybe unreliable.  Reports vomiting blood x2 week almost everyday, "about a tablespoon of blood."  Associated with significant sharp abdominal epigastric pains with radiation and nausea.  Recent OD on Neurontin denies any NSAID use.   Denies fevers.    PAST MEDICAL & SURGICAL HISTORY:  PUD (peptic ulcer disease)  GERD (gastroesophageal reflux disease)  Depression  Anxiety  Cyclic vomiting syndrome  No significant past surgical history    MEDICATIONS  (STANDING):  clonazePAM Tablet 0.5 milliGRAM(s) Oral two times a day  dextrose 5% + sodium chloride 0.9% with potassium chloride 20 mEq/L 1000 milliLiter(s) (100 mL/Hr) IV Continuous <Continuous>  FLUoxetine 10 milliGRAM(s) Oral daily  gabapentin 400 milliGRAM(s) Oral three times a day  pantoprazole  Injectable 40 milliGRAM(s) IV Push every 12 hours  QUEtiapine 200 milliGRAM(s) Oral at bedtime  sucralfate suspension 1 Gram(s) Oral every 6 hours  traZODone 75 milliGRAM(s) Oral at bedtime    MEDICATIONS  (PRN):  acetaminophen   Tablet. 650 milliGRAM(s) Oral every 6 hours PRN Mild Pain (1 - 3)  aluminum hydroxide/magnesium hydroxide/simethicone Suspension 30 milliLiter(s) Oral every 4 hours PRN Dyspepsia  ketorolac   Injectable 15 milliGRAM(s) IV Push every 6 hours PRN Moderate Pain (4 - 6)  nicotine  Polacrilex Gum 2 milliGRAM(s) Oral every 2 hours PRN smoking cessation  ondansetron   Disintegrating Tablet 4 milliGRAM(s) Oral every 6 hours PRN Nausea and/or Vomiting  ondansetron Injectable 4 milliGRAM(s) IV Push every 4 hours PRN Nausea  senna 2 Tablet(s) Oral at bedtime PRN Constipation    Allergies    morphine (Rash)    FAMILY HISTORY:  No pertinent family history in first degree relatives    REVIEW OF SYSTEMS:  ROS maybe unreliable due to psych hx.    CONSTITUTIONAL: No weakness, fevers or chills, + abdominal pain.  RESPIRATORY: No cough, wheezing, hemoptysis; No shortness of breath  CARDIOVASCULAR: No chest pain or palpitations  GASTROINTESTINAL: As per HPI.  PSYCH: Significant psych hx.  All other review of systems is negative unless indicated above.    Vital Signs Last 24 Hrs  T(C): 36.9 (14 Mar 2018 04:49), Max: 37.3 (13 Mar 2018 17:05)  T(F): 98.4 (14 Mar 2018 04:49), Max: 99.2 (13 Mar 2018 17:05)  HR: 88 (14 Mar 2018 04:49) (62 - 88)  BP: 111/70 (14 Mar 2018 04:49) (111/70 - 124/69)  BP(mean): --  RR: 18 (14 Mar 2018 04:49) (18 - 18)  SpO2: 96% (14 Mar 2018 04:49) (92% - 96%)    PHYSICAL EXAM:  Constitutional: Appears comfortable/anxious, in acute distress  Respiratory: CTAB  Cardiovascular: S1 and S2, RRR, no M/G/R  Gastrointestinal: BS+, soft, epigastric tenderness, ND  Psychiatric: + psych hx.      LABS:                        14.1   5.82  )-----------( 201      ( 14 Mar 2018 07:02 )             41.0     03-14    141  |  107  |  5<L>  ----------------------------<  96  3.4<L>   |  27  |  1.07    Ca    8.6      14 Mar 2018 07:02  Phos  3.0     03-13  Mg     1.9     03-13    TPro  6.7  /  Alb  3.9  /  TBili  1.4<H>  /  DBili  x   /  AST  25  /  ALT  54  /  AlkPhos  64  03-14      LIVER FUNCTIONS - ( 14 Mar 2018 07:02 )  Alb: 3.9 g/dL / Pro: 6.7 gm/dL / ALK PHOS: 64 U/L / ALT: 54 U/L / AST: 25 U/L / GGT: x             RADIOLOGY & ADDITIONAL STUDIES:

## 2018-03-14 NOTE — PROGRESS NOTE ADULT - SUBJECTIVE AND OBJECTIVE BOX
CHIEF COMPLAINT: epigastric pain, vomiting    SUBJECTIVE:  seen s/p egd c/o severe abd pain 10/10 and wants dilaudid 1mg IV, has gastritis in EGD.      REVIEW OF SYSTEMS:  All other review of systems is negative unless indicated above    Vital Signs Last 24 Hrs  T(C): 36.8 (14 Mar 2018 16:52), Max: 37.3 (14 Mar 2018 11:03)  T(F): 98.2 (14 Mar 2018 16:52), Max: 99.1 (14 Mar 2018 11:03)  HR: 90 (14 Mar 2018 16:52) (55 - 90)  BP: 122/54 (14 Mar 2018 16:52) (104/52 - 130/68)  BP(mean): --  RR: 18 (14 Mar 2018 16:52) (16 - 18)  SpO2: 99% (14 Mar 2018 16:52) (93% - 100%)    	PHYSICAL EXAM:  	Constitutional: NAD, awake and alert, in distress from pain  	HEENT: PERR, EOMI, Normal Hearing, MMM  	Neck: Soft and supple, No LAD, No JVD  	Respiratory: Breath sounds are clear bilaterally, No wheezing, rales or rhonchi  	Cardiovascular: S1 and S2, regular rate and rhythm, no Murmurs, gallops or rubs  	Gastrointestinal: Bowel Sounds present, soft, epigastric tenderness with guarding, pt is not cooperative with exam  	Extremities: No peripheral edema  	Vascular: 2+ peripheral pulses  	Neurological: A/O x 3, no focal deficits  	Musculoskeletal: 5/5 strength b/l upper and lower extremities  	Skin: No rashes    MEDICATIONS:  MEDICATIONS  (STANDING):  clonazePAM Tablet 0.5 milliGRAM(s) Oral two times a day  dextrose 5% + sodium chloride 0.9% with potassium chloride 20 mEq/L 1000 milliLiter(s) (100 mL/Hr) IV Continuous <Continuous>  FLUoxetine 10 milliGRAM(s) Oral daily  gabapentin 400 milliGRAM(s) Oral three times a day  pantoprazole    Tablet 40 milliGRAM(s) Oral two times a day  QUEtiapine 200 milliGRAM(s) Oral at bedtime  sucralfate suspension 1 Gram(s) Oral four times a day  sucralfate suspension 1 Gram(s) Oral every 6 hours  traZODone 75 milliGRAM(s) Oral at bedtime    LABS: All Labs Reviewed:                        14.1   5.82  )-----------( 201      ( 14 Mar 2018 07:02 )             41.0     141  |  107  |  5<L>  ----------------------------<  96  3.4<L>   |  27  |  1.07    Ca    8.6      14 Mar 2018 07:02  Phos  3.0     03-13  Mg     1.9     03-13    TPro  6.7  /  Alb  3.9  /  TBili  1.4<H>  /  DBili  x   /  AST  25  /  ALT  54  /  AlkPhos  64  03-14

## 2018-03-14 NOTE — CONSULT NOTE ADULT - ASSESSMENT
27 y/o with hemoptysis x2 weeks.    Impression:  R/O PUD vs. GERD vs. esophagitis vs. gastritis.    Plan:  NPO  EGD with Dr. Pham today  Supportive care.     Discussed with pt, nurse, and Dr. Pham.

## 2018-03-15 ENCOUNTER — TRANSCRIPTION ENCOUNTER (OUTPATIENT)
Age: 28
End: 2018-03-15

## 2018-03-15 VITALS
SYSTOLIC BLOOD PRESSURE: 104 MMHG | RESPIRATION RATE: 18 BRPM | OXYGEN SATURATION: 97 % | TEMPERATURE: 99 F | HEART RATE: 80 BPM | DIASTOLIC BLOOD PRESSURE: 63 MMHG

## 2018-03-15 DIAGNOSIS — Z76.5 MALINGERER [CONSCIOUS SIMULATION]: ICD-10-CM

## 2018-03-15 DIAGNOSIS — F33.2 MAJOR DEPRESSIVE DISORDER, RECURRENT SEVERE WITHOUT PSYCHOTIC FEATURES: ICD-10-CM

## 2018-03-15 DIAGNOSIS — J45.909 UNSPECIFIED ASTHMA, UNCOMPLICATED: ICD-10-CM

## 2018-03-15 DIAGNOSIS — T42.6X2A POISONING BY OTHER ANTIEPILEPTIC AND SEDATIVE-HYPNOTIC DRUGS, INTENTIONAL SELF-HARM, INITIAL ENCOUNTER: ICD-10-CM

## 2018-03-15 DIAGNOSIS — G43.A0 CYCLICAL VOMITING, IN MIGRAINE, NOT INTRACTABLE: ICD-10-CM

## 2018-03-15 DIAGNOSIS — F60.3 BORDERLINE PERSONALITY DISORDER: ICD-10-CM

## 2018-03-15 DIAGNOSIS — F19.10 OTHER PSYCHOACTIVE SUBSTANCE ABUSE, UNCOMPLICATED: ICD-10-CM

## 2018-03-15 DIAGNOSIS — F41.9 ANXIETY DISORDER, UNSPECIFIED: ICD-10-CM
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
## 2018-03-15 DIAGNOSIS — F17.210 NICOTINE DEPENDENCE, CIGARETTES, UNCOMPLICATED: ICD-10-CM

## 2018-03-15 LAB
ALBUMIN SERPL ELPH-MCNC: 3.4 G/DL — SIGNIFICANT CHANGE UP (ref 3.3–5)
ALP SERPL-CCNC: 56 U/L — SIGNIFICANT CHANGE UP (ref 40–120)
ALT FLD-CCNC: 42 U/L — SIGNIFICANT CHANGE UP (ref 12–78)
ANION GAP SERPL CALC-SCNC: 8 MMOL/L — SIGNIFICANT CHANGE UP (ref 5–17)
AST SERPL-CCNC: 18 U/L — SIGNIFICANT CHANGE UP (ref 15–37)
BASOPHILS # BLD AUTO: 0.02 K/UL — SIGNIFICANT CHANGE UP (ref 0–0.2)
BASOPHILS NFR BLD AUTO: 0.3 % — SIGNIFICANT CHANGE UP (ref 0–2)
BILIRUB SERPL-MCNC: 0.7 MG/DL — SIGNIFICANT CHANGE UP (ref 0.2–1.2)
BUN SERPL-MCNC: 3 MG/DL — LOW (ref 7–23)
CALCIUM SERPL-MCNC: 8.3 MG/DL — LOW (ref 8.5–10.1)
CHLORIDE SERPL-SCNC: 106 MMOL/L — SIGNIFICANT CHANGE UP (ref 96–108)
CO2 SERPL-SCNC: 28 MMOL/L — SIGNIFICANT CHANGE UP (ref 22–31)
CREAT SERPL-MCNC: 0.86 MG/DL — SIGNIFICANT CHANGE UP (ref 0.5–1.3)
EOSINOPHIL # BLD AUTO: 0.25 K/UL — SIGNIFICANT CHANGE UP (ref 0–0.5)
EOSINOPHIL NFR BLD AUTO: 3.5 % — SIGNIFICANT CHANGE UP (ref 0–6)
GLUCOSE SERPL-MCNC: 101 MG/DL — HIGH (ref 70–99)
HCT VFR BLD CALC: 38 % — LOW (ref 39–50)
HGB BLD-MCNC: 13.3 G/DL — SIGNIFICANT CHANGE UP (ref 13–17)
IMM GRANULOCYTES NFR BLD AUTO: 0.3 % — SIGNIFICANT CHANGE UP (ref 0–1.5)
LYMPHOCYTES # BLD AUTO: 1.61 K/UL — SIGNIFICANT CHANGE UP (ref 1–3.3)
LYMPHOCYTES # BLD AUTO: 22.5 % — SIGNIFICANT CHANGE UP (ref 13–44)
MCHC RBC-ENTMCNC: 30.1 PG — SIGNIFICANT CHANGE UP (ref 27–34)
MCHC RBC-ENTMCNC: 35 GM/DL — SIGNIFICANT CHANGE UP (ref 32–36)
MCV RBC AUTO: 86 FL — SIGNIFICANT CHANGE UP (ref 80–100)
MONOCYTES # BLD AUTO: 0.54 K/UL — SIGNIFICANT CHANGE UP (ref 0–0.9)
MONOCYTES NFR BLD AUTO: 7.6 % — SIGNIFICANT CHANGE UP (ref 2–14)
NEUTROPHILS # BLD AUTO: 4.7 K/UL — SIGNIFICANT CHANGE UP (ref 1.8–7.4)
NEUTROPHILS NFR BLD AUTO: 65.8 % — SIGNIFICANT CHANGE UP (ref 43–77)
NRBC # BLD: 0 /100 WBCS — SIGNIFICANT CHANGE UP (ref 0–0)
PLATELET # BLD AUTO: 192 K/UL — SIGNIFICANT CHANGE UP (ref 150–400)
POTASSIUM SERPL-MCNC: 3.6 MMOL/L — SIGNIFICANT CHANGE UP (ref 3.5–5.3)
POTASSIUM SERPL-SCNC: 3.6 MMOL/L — SIGNIFICANT CHANGE UP (ref 3.5–5.3)
PROT SERPL-MCNC: 6 GM/DL — SIGNIFICANT CHANGE UP (ref 6–8.3)
RBC # BLD: 4.42 M/UL — SIGNIFICANT CHANGE UP (ref 4.2–5.8)
RBC # FLD: 13.2 % — SIGNIFICANT CHANGE UP (ref 10.3–14.5)
SODIUM SERPL-SCNC: 142 MMOL/L — SIGNIFICANT CHANGE UP (ref 135–145)
SURGICAL PATHOLOGY FINAL REPORT - CH: SIGNIFICANT CHANGE UP
WBC # BLD: 7.14 K/UL — SIGNIFICANT CHANGE UP (ref 3.8–10.5)
WBC # FLD AUTO: 7.14 K/UL — SIGNIFICANT CHANGE UP (ref 3.8–10.5)

## 2018-03-15 PROCEDURE — 93010 ELECTROCARDIOGRAM REPORT: CPT

## 2018-03-15 RX ORDER — SUCRALFATE 1 G
10 TABLET ORAL
Qty: 400 | Refills: 0 | OUTPATIENT
Start: 2018-03-15 | End: 2018-03-24

## 2018-03-15 RX ORDER — ONDANSETRON 8 MG/1
1 TABLET, FILM COATED ORAL
Qty: 24 | Refills: 0 | OUTPATIENT
Start: 2018-03-15 | End: 2018-03-20

## 2018-03-15 RX ORDER — PANTOPRAZOLE SODIUM 20 MG/1
1 TABLET, DELAYED RELEASE ORAL
Qty: 60 | Refills: 0 | OUTPATIENT
Start: 2018-03-15 | End: 2018-04-13

## 2018-03-15 RX ADMIN — Medication 1 GRAM(S): at 11:18

## 2018-03-15 RX ADMIN — ONDANSETRON 4 MILLIGRAM(S): 8 TABLET, FILM COATED ORAL at 08:38

## 2018-03-15 RX ADMIN — PANTOPRAZOLE SODIUM 40 MILLIGRAM(S): 20 TABLET, DELAYED RELEASE ORAL at 17:31

## 2018-03-15 RX ADMIN — HYDROMORPHONE HYDROCHLORIDE 1 MILLIGRAM(S): 2 INJECTION INTRAMUSCULAR; INTRAVENOUS; SUBCUTANEOUS at 04:33

## 2018-03-15 RX ADMIN — Medication 1 GRAM(S): at 11:17

## 2018-03-15 RX ADMIN — HYDROMORPHONE HYDROCHLORIDE 1 MILLIGRAM(S): 2 INJECTION INTRAMUSCULAR; INTRAVENOUS; SUBCUTANEOUS at 13:12

## 2018-03-15 RX ADMIN — HYDROMORPHONE HYDROCHLORIDE 1 MILLIGRAM(S): 2 INJECTION INTRAMUSCULAR; INTRAVENOUS; SUBCUTANEOUS at 12:38

## 2018-03-15 RX ADMIN — DEXTROSE MONOHYDRATE, SODIUM CHLORIDE, AND POTASSIUM CHLORIDE 100 MILLILITER(S): 50; .745; 4.5 INJECTION, SOLUTION INTRAVENOUS at 11:25

## 2018-03-15 RX ADMIN — ONDANSETRON 4 MILLIGRAM(S): 8 TABLET, FILM COATED ORAL at 05:12

## 2018-03-15 RX ADMIN — PANTOPRAZOLE SODIUM 40 MILLIGRAM(S): 20 TABLET, DELAYED RELEASE ORAL at 05:13

## 2018-03-15 RX ADMIN — Medication 0.5 MILLIGRAM(S): at 17:31

## 2018-03-15 RX ADMIN — HYDROMORPHONE HYDROCHLORIDE 1 MILLIGRAM(S): 2 INJECTION INTRAMUSCULAR; INTRAVENOUS; SUBCUTANEOUS at 09:39

## 2018-03-15 RX ADMIN — ONDANSETRON 4 MILLIGRAM(S): 8 TABLET, FILM COATED ORAL at 12:40

## 2018-03-15 RX ADMIN — Medication 0.5 MILLIGRAM(S): at 05:13

## 2018-03-15 RX ADMIN — GABAPENTIN 400 MILLIGRAM(S): 400 CAPSULE ORAL at 13:12

## 2018-03-15 RX ADMIN — HYDROMORPHONE HYDROCHLORIDE 1 MILLIGRAM(S): 2 INJECTION INTRAMUSCULAR; INTRAVENOUS; SUBCUTANEOUS at 08:36

## 2018-03-15 RX ADMIN — Medication 10 MILLIGRAM(S): at 11:17

## 2018-03-15 RX ADMIN — GABAPENTIN 400 MILLIGRAM(S): 400 CAPSULE ORAL at 05:13

## 2018-03-15 NOTE — DISCHARGE NOTE ADULT - MEDICATION SUMMARY - MEDICATIONS TO TAKE
I will START or STAY ON the medications listed below when I get home from the hospital:    aluminum hydroxide-magnesium hydroxide 200 mg-200 mg/5 mL oral suspension  -- 30 milliliter(s) by mouth every 4 hours, As needed, Dyspepsia  -- Indication: For indigestion    gabapentin 400 mg oral capsule  -- 1 cap(s) by mouth 3 times a day  -- Indication: For .    clonazePAM 0.5 mg oral tablet  -- 1 tab(s) by mouth 2 times a day  -- Indication: For .    traZODone 150 mg oral tablet  -- 0.5 tab(s) by mouth once a day (at bedtime)  -- Indication: For .    FLUoxetine 10 mg oral capsule  -- 1 cap(s) by mouth once a day  -- Indication: For .    ondansetron 4 mg oral tablet, disintegrating  -- 1 tab(s) by mouth every 6 hours, As needed, Nausea and/or Vomiting  -- Indication: For nausea    SEROquel 200 mg oral tablet  -- 1 tab(s) by mouth once a day (at bedtime)  -- Indication: For .    sucralfate 1 g/10 mL oral suspension  -- 10 milliliter(s) by mouth 4 times a day (before meals and at bedtime)   -- Indication: For Gastritis    pantoprazole 40 mg oral delayed release tablet  -- 1 tab(s) by mouth 2 times a day  -- Indication: For Gastritis

## 2018-03-15 NOTE — DISCHARGE NOTE ADULT - HOSPITAL COURSE
CHIEF COMPLAINT: epigastric pain, vomiting  SUBJECTIVE:  setting alarm clock to get dilaudid, asking for increased doses of dilaudid, c/o 10/10 pain.  Dilaudid has been stopped.   REVIEW OF SYSTEMS: All other review of systems is negative unless indicated above  Vital Signs Last 24 Hrs  T(C): 36.4 (15 Mar 2018 08:36), Max: 37.3 (14 Mar 2018 16:45)  T(F): 97.5 (15 Mar 2018 08:36), Max: 99.1 (14 Mar 2018 16:45)  HR: 65 (15 Mar 2018 08:36) (65 - 90)  BP: 122/67 (15 Mar 2018 08:36) (104/52 - 122/67)  BP(mean): --  RR: 18 (15 Mar 2018 08:36) (18 - 18)  SpO2: 100% (15 Mar 2018 08:36) (93% - 100%)  PHYSICAL EXAM:  Constitutional: NAD, well-groomed, well-developed  HEENT: PERRLA, EOMI, Normal Hearing, MMM  Neck: No LAD, No JVD  Back: Normal spine flexure, No CVA tenderness  Respiratory: CTABL  Cardiovascular: S1 and S2, RRR, no M/G/R  Gastrointestinal: BS+, soft, NT/ND  Extremities: No peripheral edema  Vascular: 2+ peripheral pulses  Neurological: A/O x 3, no focal deficits  Psychiatric: Normal mood, normal affect  Musculoskeletal: 5/5 strength b/l upper and lower extremities  Skin: No rashes               13.3   7.14  )-----------( 192      ( 15 Mar 2018 07:41 )             38.0     142  |  106  |  3<L>  ----------------------------<  101<H>  3.6   |  28  |  0.86    Ca    8.3<L>      15 Mar 2018 07:41    TPro  6.0  /  Alb  3.4  /  TBili  0.7  /  DBili  x   /  AST  18  /  ALT  42  /  AlkPhos  56  03-15    LIVER FUNCTIONS - ( 15 Mar 2018 07:41 )  Alb: 3.4 g/dL / Pro: 6.0 gm/dL / ALK PHOS: 56 U/L / ALT: 42 U/L / AST: 18 U/L / GGT: x           A/P  29 y/o male with PMH of GERD, h/o PUD, Cycling vomiting syndrome,  depression, insomnia, anxiety, polysubstance abuse, multiple suicidal attempts and seeking behavior per psych records admitted to  and ready for discharge but pt requesting to be seen by the doctor because he reports severe epigastric pain 10/10, sharp, non-radiating, constant, associated with nausea, vomiting. Patient saying that he sees the blood in the vomit about table spoon and vomiting 10 times per day since he is in the hospital. He denies any diarrhea, no stool for 1 week, unable to eat, decreased amount of urine.   Denies CP, dyspnea, cough, HA, other sx.    * Severe epigastric abdominal pain with h/o GERD and PUD- EGD w gastritis  * Nausea, hematemesis  * Cyclic vomiting syndrome  * drug seeking- no source of pain to cause degree of reported pain   - regular diet  - CT abd unremarkable  - BID PPI, carafate, antiemetics  - GI consult appreciated    * Hypokalemia due to GI loss  - replaced     * Dehydration  - improved    * Depression  - c/w SSRI  - fluoxetine and trazadone    * Anxiety  - c/w seroquel and clonazepam    d/w Dr. Pham    time spent on discharge 35 minutes

## 2018-03-15 NOTE — DISCHARGE NOTE ADULT - CARE PLAN
Principal Discharge DX:	PUD (peptic ulcer disease)  Goal:	resolution, follow up  Assessment and plan of treatment:	Take protonix twice daily for 10 days then decrease to once daily  take carafate 4 times daily- before meals and before bed for 10 days  zofran as needed for nausea

## 2018-03-15 NOTE — DISCHARGE NOTE ADULT - CARE PROVIDERS DIRECT ADDRESSES
[email or web address],DirectAddress_Unknown,DirectAddress_Unknown

## 2018-03-15 NOTE — DISCHARGE NOTE ADULT - PATIENT PORTAL LINK FT
You can access the [facility] Patient Portal, offered by [facility], by registering with the following website: [email or web address]

## 2018-03-15 NOTE — DISCHARGE NOTE ADULT - PROVIDER TOKENS
TOKEN:'[id number]',TOKEN:'[id number]',FREE:[LAST:[psychiatrist],PHONE:[(   )    -],FAX:[(   )    -]]

## 2018-03-15 NOTE — DISCHARGE NOTE ADULT - CARE PROVIDER_API CALL
Ghazala Tang (), Residency  Admin  [address] [place] [address]  Phone: [phone]  Fax: [phone]    Parminder Pham), Gastroenterology; Internal Medicine  [address] [place] [address]  Phone: [phone]  Fax: [phone]    psychiatrist,   Phone: (   )    -  Fax: (   )    -

## 2018-03-15 NOTE — DISCHARGE NOTE ADULT - PLAN OF CARE
resolution, follow up Take protonix twice daily for 10 days then decrease to once daily  take carafate 4 times daily- before meals and before bed for 10 days  zofran as needed for nausea

## 2018-03-15 NOTE — PROGRESS NOTE ADULT - SUBJECTIVE AND OBJECTIVE BOX
Patient is a 28y old  Male who presents with a chief complaint of severe epigastric pain (13 Mar 2018 14:14)      HPI:  pt resting comfortably  on awakening states he needs pain meds    PAST MEDICAL & SURGICAL HISTORY:  PUD (peptic ulcer disease)  GERD (gastroesophageal reflux disease)  Depression  Anxiety  Cyclic vomiting syndrome  No significant past surgical history    MEDICATIONS  (STANDING):  clonazePAM Tablet 0.5 milliGRAM(s) Oral two times a day  dextrose 5% + sodium chloride 0.9% with potassium chloride 20 mEq/L 1000 milliLiter(s) (100 mL/Hr) IV Continuous <Continuous>  FLUoxetine 10 milliGRAM(s) Oral daily  gabapentin 400 milliGRAM(s) Oral three times a day  pantoprazole    Tablet 40 milliGRAM(s) Oral two times a day  QUEtiapine 200 milliGRAM(s) Oral at bedtime  sucralfate suspension 1 Gram(s) Oral four times a day  sucralfate suspension 1 Gram(s) Oral every 6 hours  traZODone 75 milliGRAM(s) Oral at bedtime    MEDICATIONS  (PRN):  acetaminophen   Tablet. 650 milliGRAM(s) Oral every 6 hours PRN Mild Pain (1 - 3)  aluminum hydroxide/magnesium hydroxide/simethicone Suspension 30 milliLiter(s) Oral every 4 hours PRN Dyspepsia  HYDROmorphone  Injectable 1 milliGRAM(s) IV Push every 4 hours PRN Severe Pain (7 - 10)  nicotine  Polacrilex Gum 2 milliGRAM(s) Oral every 2 hours PRN smoking cessation  ondansetron   Disintegrating Tablet 4 milliGRAM(s) Oral every 6 hours PRN Nausea and/or Vomiting  ondansetron Injectable 4 milliGRAM(s) IV Push every 4 hours PRN Nausea  senna 2 Tablet(s) Oral at bedtime PRN Constipation    Allergies  morphine (Rash)    REVIEW OF SYSTEMS:    CONSTITUTIONAL: No weakness, fevers or chills  RESPIRATORY: No cough, wheezing, hemoptysis; No shortness of breath  CARDIOVASCULAR: No chest pain or palpitations  GASTROINTESTINAL: abdominal pain persists  All other review of systems is negative unless indicated above.    Vital Signs Last 24 Hrs  T(C): 36.8 (15 Mar 2018 04:57), Max: 37.3 (14 Mar 2018 11:03)  T(F): 98.3 (15 Mar 2018 04:57), Max: 99.1 (14 Mar 2018 11:03)  HR: 73 (15 Mar 2018 04:57) (55 - 90)  BP: 121/70 (15 Mar 2018 04:57) (104/52 - 130/68)  BP(mean): --  RR: 18 (15 Mar 2018 04:57) (16 - 18)  SpO2: 99% (15 Mar 2018 04:57) (93% - 100%)    PHYSICAL EXAM:    Constitutional: NAD, well-developed  Respiratory: CTAB  Cardiovascular: S1 and S2, RRR, no M/G/R  Gastrointestinal: BS+, soft, tender epigastrium      LABS:                        14.1   5.82  )-----------( 201      ( 14 Mar 2018 07:02 )             41.0     03-14    141  |  107  |  5<L>  ----------------------------<  96  3.4<L>   |  27  |  1.07    Ca    8.6      14 Mar 2018 07:02  Phos  3.0     03-13  Mg     1.9     03-13    TPro  6.7  /  Alb  3.9  /  TBili  1.4<H>  /  DBili  x   /  AST  25  /  ALT  54  /  AlkPhos  64  03-14      LIVER FUNCTIONS - ( 14 Mar 2018 07:02 )  Alb: 3.9 g/dL / Pro: 6.7 gm/dL / ALK PHOS: 64 U/L / ALT: 54 U/L / AST: 25 U/L / GGT: x             RADIOLOGY & ADDITIONAL STUDIES:

## 2018-03-18 LAB
CULTURE RESULTS: SIGNIFICANT CHANGE UP
CULTURE RESULTS: SIGNIFICANT CHANGE UP
SPECIMEN SOURCE: SIGNIFICANT CHANGE UP
SPECIMEN SOURCE: SIGNIFICANT CHANGE UP

## 2018-03-19 DIAGNOSIS — K31.89 OTHER DISEASES OF STOMACH AND DUODENUM: ICD-10-CM

## 2018-03-19 DIAGNOSIS — E87.6 HYPOKALEMIA: ICD-10-CM

## 2018-03-19 DIAGNOSIS — F41.9 ANXIETY DISORDER, UNSPECIFIED: ICD-10-CM

## 2018-03-19 DIAGNOSIS — F19.10 OTHER PSYCHOACTIVE SUBSTANCE ABUSE, UNCOMPLICATED: ICD-10-CM

## 2018-03-19 DIAGNOSIS — R11.10 VOMITING, UNSPECIFIED: ICD-10-CM

## 2018-03-19 DIAGNOSIS — K29.70 GASTRITIS, UNSPECIFIED, WITHOUT BLEEDING: ICD-10-CM

## 2018-03-19 DIAGNOSIS — K21.9 GASTRO-ESOPHAGEAL REFLUX DISEASE WITHOUT ESOPHAGITIS: ICD-10-CM

## 2018-03-19 DIAGNOSIS — F32.9 MAJOR DEPRESSIVE DISORDER, SINGLE EPISODE, UNSPECIFIED: ICD-10-CM

## 2018-03-19 DIAGNOSIS — G47.00 INSOMNIA, UNSPECIFIED: ICD-10-CM

## 2018-03-19 DIAGNOSIS — K27.9 PEPTIC ULCER, SITE UNSPECIFIED, UNSPECIFIED AS ACUTE OR CHRONIC, WITHOUT HEMORRHAGE OR PERFORATION: ICD-10-CM

## 2018-03-19 DIAGNOSIS — Z91.5 PERSONAL HISTORY OF SELF-HARM: ICD-10-CM

## 2018-03-19 DIAGNOSIS — Z88.5 ALLERGY STATUS TO NARCOTIC AGENT: ICD-10-CM

## 2018-03-19 DIAGNOSIS — E86.0 DEHYDRATION: ICD-10-CM

## 2018-03-19 DIAGNOSIS — R10.13 EPIGASTRIC PAIN: ICD-10-CM

## 2018-03-20 ENCOUNTER — EMERGENCY (EMERGENCY)
Facility: HOSPITAL | Age: 28
LOS: 0 days | Discharge: HOME | End: 2018-03-20
Attending: EMERGENCY MEDICINE | Admitting: EMERGENCY MEDICINE
Payer: MEDICAID

## 2018-03-20 VITALS
OXYGEN SATURATION: 100 % | DIASTOLIC BLOOD PRESSURE: 81 MMHG | SYSTOLIC BLOOD PRESSURE: 123 MMHG | TEMPERATURE: 98 F | RESPIRATION RATE: 17 BRPM | WEIGHT: 169.98 LBS | HEART RATE: 100 BPM

## 2018-03-20 DIAGNOSIS — F41.9 ANXIETY DISORDER, UNSPECIFIED: ICD-10-CM

## 2018-03-20 DIAGNOSIS — Z76.0 ENCOUNTER FOR ISSUE OF REPEAT PRESCRIPTION: ICD-10-CM

## 2018-03-20 PROCEDURE — 99283 EMERGENCY DEPT VISIT LOW MDM: CPT

## 2018-03-20 RX ORDER — CLONAZEPAM 1 MG
2 TABLET ORAL ONCE
Qty: 0 | Refills: 0 | Status: DISCONTINUED | OUTPATIENT
Start: 2018-03-20 | End: 2018-03-20

## 2018-03-20 RX ADMIN — Medication 2 MILLIGRAM(S): at 10:55

## 2018-03-20 NOTE — ED ADULT NURSE NOTE - CHPI ED SYMPTOMS NEG
no hallucinations/no change in level of consciousness/no paranoia/no homicidal/no disorientation/no weight loss/no weakness/no agitation/no confusion/no suicidal

## 2018-03-20 NOTE — ED ADULT NURSE REASSESSMENT NOTE - NS ED NURSE REASSESS COMMENT FT1
pt. asked to see TAIWO, pt. referred to [facility] Shearer, and being seen by Janki MARAVILLA. Pt. expressing financial concerns about transportation home. Pt. denies SI/HI at this time, is calm and acting appropriate.

## 2018-03-20 NOTE — ED PROVIDER NOTE - OBJECTIVE STATEMENT
28 y-o Male with PMHX of Depression, Anxiety, GERD, Cyclical vomiting, Pt presents to ED with complaints of anxiety attack. Pt has hx of substance abuse states he was seen in ED 1 week ago for SI attempt and prescribed Clonopin. Pt notes Clonopin is not covered by his insurance because it is to soon to refill,  and wont see Psychologist till 3/28/18. Pt denies SI/HI. Prescribed Clonazepam 3/13 , Clonopin. Psych Dr Cooper

## 2018-03-20 NOTE — ED PROVIDER NOTE - MEDICAL DECISION MAKING DETAILS
Pt unable to get refill.  Will give single dose clonazepam here.  No SI, HI, or hallucinations.  F/u with psychiatrist as scheduled on 3/28/18.

## 2018-03-20 NOTE — SBIRT NOTE. - NSSBIRTFULLSCREEN_GEN_A_ED_FT
Health  spoke with patient after ED d/c as per RN request; patient will follow-up with outpatient program arranged by [facility] during prior [facility] inpatient admission; Mary Chandler LCSW, also spoke with patient; patient reported to this author he is feeling well supported and confirmed via teach-back that he would go to the nearest ED again if he was in distress.

## 2018-03-20 NOTE — ED ADULT NURSE NOTE - OBJECTIVE STATEMENT
Pt. c/o of anxiety and not being able to fill his Klonopin and gabapentin prescription because his insurance does not cover it and he cannot afford it. Pt. denies SI/HI and states he "needs a prescription for something." Pt. states he is in benzodiazepine withdrawl. Pt. states he is having panic attack, pt. is calm, speaking coherently, no difficulty breathing, no SOB and no tremors.

## 2018-03-20 NOTE — ED ADULT TRIAGE NOTE - CHIEF COMPLAINT QUOTE
pt presents to ED with complaints of anxiety attack pt has hx of substance abuse states he was seen in ED 1 week ago and prescribed medication that was not covered by his insurance pt denies SI/HI

## 2018-03-22 ENCOUNTER — EMERGENCY (EMERGENCY)
Facility: HOSPITAL | Age: 28
LOS: 0 days | Discharge: HOME | End: 2018-03-22
Attending: EMERGENCY MEDICINE | Admitting: EMERGENCY MEDICINE
Payer: MEDICAID

## 2018-03-22 VITALS
HEIGHT: 73 IN | DIASTOLIC BLOOD PRESSURE: 84 MMHG | SYSTOLIC BLOOD PRESSURE: 123 MMHG | RESPIRATION RATE: 18 BRPM | HEART RATE: 83 BPM | WEIGHT: 175.05 LBS | TEMPERATURE: 98 F | OXYGEN SATURATION: 100 %

## 2018-03-22 PROCEDURE — 99285 EMERGENCY DEPT VISIT HI MDM: CPT | Mod: 25

## 2018-03-22 RX ADMIN — Medication 1 MILLIGRAM(S): at 16:29

## 2018-03-22 RX ADMIN — Medication 1 MILLIGRAM(S): at 14:33

## 2018-03-22 NOTE — SBIRT NOTE. - NSSBIRTSERVICES_GEN_A_ED_FT
Provided SBIRT services: Full screen Negative. Positive reinforcement provided given patient currently within healthy guidelines. Education materials reviewed and given to patient.  Audit Score: 0  DAST Score: 0    Dr. Olivares requested SBIRT consult, patient RN SBIRT pre-screen negative.

## 2018-03-22 NOTE — ED PROVIDER NOTE - OBJECTIVE STATEMENT
27 y/o M with a PMHx of anxiety and depression, presents to ED c/o panic attack. Reports he has been having a panic attack all day. States he was here 3 days ago for panic attack, seen in [facility], given 2mg Klonopin and d/c home. States he normally takes 2mg Klonopin BID. Reports he has no medication at home, last dose was 3 days ago in ED. Pt has next appointment w/ doctor on 28th. Non-drinker, non-smoker, no recreational drug use. Denies SI or HI. PMD at [facility].

## 2018-03-22 NOTE — SBIRT NOTE. - NSSBIRTFULLSCREEN_GEN_A_ED_FT
Naloxone Rescue Kit dispensed: Patient was educated about Naloxone and trained on how to assemble and utilize the kit on 6/5/17.

## 2018-03-22 NOTE — ED ADULT NURSE NOTE - OBJECTIVE STATEMENT
Pt presents to ED from home, pt alert and orientedx4, VSS afebril,e pt c/o anxiety attack since this AM, pt states he has no medication to control his anxiety was seen here a few days ago and given klonopin qirh reliwd notws. pt states that he is in benzo withdrawal as he has no medication and will not see a doctor until the 28th of march, pt denies SI.HI safet maintained, will continue to monitor

## 2018-03-22 NOTE — ED ADULT TRIAGE NOTE - CHIEF COMPLAINT QUOTE
C/O panic attack. Patient seen at ED on Tuesday, given Klonopin 2 mg and discharged home. Patient requesting Klonopin dose at this time. Patient reports he is experiencing Benzo withdrawal.

## 2018-03-22 NOTE — ED PROVIDER NOTE - MEDICAL DECISION MAKING DETAILS
29 y/o M c/o worsening anxiety with plans to receive Ativan Pt without signs of acute benzo withdrawal.  Given ativan in ED for subjective anxiety.  Seen by USHA Bacon for d/c home at this time.  Will not receive prescription here.  F/u with psychiatrist as scheduled on the 28th.

## 2018-03-23 DIAGNOSIS — F41.0 PANIC DISORDER [EPISODIC PAROXYSMAL ANXIETY]: ICD-10-CM

## 2018-03-23 DIAGNOSIS — F32.9 MAJOR DEPRESSIVE DISORDER, SINGLE EPISODE, UNSPECIFIED: ICD-10-CM

## 2018-03-23 DIAGNOSIS — Z88.5 ALLERGY STATUS TO NARCOTIC AGENT: ICD-10-CM

## 2018-03-23 DIAGNOSIS — F41.9 ANXIETY DISORDER, UNSPECIFIED: ICD-10-CM

## 2018-03-23 DIAGNOSIS — K21.9 GASTRO-ESOPHAGEAL REFLUX DISEASE WITHOUT ESOPHAGITIS: ICD-10-CM

## 2018-03-27 ENCOUNTER — EMERGENCY (EMERGENCY)
Facility: HOSPITAL | Age: 28
LOS: 0 days | Discharge: HOME | End: 2018-03-27
Attending: EMERGENCY MEDICINE | Admitting: EMERGENCY MEDICINE
Payer: MEDICAID

## 2018-03-27 VITALS — HEIGHT: 68 IN | WEIGHT: 164.91 LBS

## 2018-03-27 VITALS
SYSTOLIC BLOOD PRESSURE: 121 MMHG | HEART RATE: 107 BPM | OXYGEN SATURATION: 100 % | TEMPERATURE: 98 F | RESPIRATION RATE: 18 BRPM | DIASTOLIC BLOOD PRESSURE: 70 MMHG

## 2018-03-27 DIAGNOSIS — Z76.0 ENCOUNTER FOR ISSUE OF REPEAT PRESCRIPTION: ICD-10-CM

## 2018-03-27 DIAGNOSIS — F41.9 ANXIETY DISORDER, UNSPECIFIED: ICD-10-CM

## 2018-03-27 DIAGNOSIS — F32.9 MAJOR DEPRESSIVE DISORDER, SINGLE EPISODE, UNSPECIFIED: ICD-10-CM

## 2018-03-27 DIAGNOSIS — Z88.5 ALLERGY STATUS TO NARCOTIC AGENT: ICD-10-CM

## 2018-03-27 DIAGNOSIS — Z87.19 PERSONAL HISTORY OF OTHER DISEASES OF THE DIGESTIVE SYSTEM: ICD-10-CM

## 2018-03-27 PROCEDURE — 99283 EMERGENCY DEPT VISIT LOW MDM: CPT

## 2018-03-27 PROCEDURE — 93010 ELECTROCARDIOGRAM REPORT: CPT

## 2018-03-27 RX ORDER — CLONAZEPAM 1 MG
2 TABLET ORAL ONCE
Qty: 0 | Refills: 0 | Status: DISCONTINUED | OUTPATIENT
Start: 2018-03-27 | End: 2018-03-27

## 2018-03-27 RX ADMIN — Medication 2 MILLIGRAM(S): at 13:05

## 2018-03-27 NOTE — ED STATDOCS - MUSCULOSKELETAL, MLM
range of motion is not limited and there is no muscle tenderness. range of motion is not limited and there is no muscle tenderness. 5/5 strength in flexion and extension of all limbs.

## 2018-03-27 NOTE — ED STATDOCS - OBJECTIVE STATEMENT
29 y/o male with PMHx of anxiety, asthma presents to the ED c/o panic attack starting at 6am. Pt states he is on 2mg of Klonopin 2x/day. States he was hospitalized earlier this month and was discharged without any benzodiazepines. +palpitations, SOB. Feels like the walls are closing in and feels trapped. Current attack feels like his usual attacks and states it is alleviated by the Klonopin. Pt has been to the ED twice for same complaint and has received prescriptions for Klonopin and Ativan to hold him until his doctors appointments. Pt has an appointment with his doctor tomorrow for a prescription.

## 2018-03-27 NOTE — ED STATDOCS - NEUROLOGICAL, MLM
sensation is normal and strength is normal. sensation is normal and strength is normal. CNs 2-12 intact. No nuchal rigidity.

## 2018-03-27 NOTE — ED STATDOCS - NS_ ATTENDINGSCRIBEDETAILS _ED_A_ED_FT
I Rodolfo Gatica MD saw and examined the patient. Scribe documented for me and under my supervision. I have modified the scribe's documentation where necessary to reflect my history, physical exam and other relevant documentations pertinent to the care of the patient.

## 2018-03-27 NOTE — ED STATDOCS - PROGRESS NOTE DETAILS
signed Kanchan Olivier PA-C Pt seen initially in intake by Dr Gatica.   Pt c/o anxiety, has appt with Dr Kody Gregg tomorrow 7pm. Pt has been seen in ED this month on the 7, 9, 10, 11, 12, 13, 14, 15, 20, and 22 for similar complaints. Dr Gatica to order klonopin 2mg in ED. Spoke to  at Dr Gregg's office [phone], confirmed pt has appt on 3/28 at 7pm. [facility] shows rx for 30 day supply of 2mg clonazepam and 10mg zolpidem filled on 2/28. pt has consistent [facility] hx from [place] [facility] and [place]. signed Kanchan Olivier PA-C   Pt feeling better after meds. Will f/u with Dr Gregg tomorrow. pt notes he was admitted to hospital early this month and his meds were misplaced by the police, states he [place] the precinct but has been unable to figure out what happened. Pt alert NAD. Pt feeling well, agrees with DC and plan of care.

## 2018-03-27 NOTE — ED ADULT NURSE NOTE - OBJECTIVE STATEMENT
Pt reports hx of panic attacks. Pt reports taking Klonopin BID and having an appointment with psychiartrist and not having any medication to last today.

## 2018-03-27 NOTE — ED ADULT TRIAGE NOTE - CHIEF COMPLAINT QUOTE
patient presents in ED requesting Klonopin. patient was seen in ER 5 days . patient states " I have an appointment with my doctor tomorrow but I cant wait ". patient denies SI or HI. patient does not appear in any distress.

## 2018-03-27 NOTE — ED STATDOCS - RESPIRATORY, MLM
breath sounds clear and equal bilaterally. breath sounds clear and equal bilaterally. No retractions or tachypnea.

## 2018-03-27 NOTE — ED STATDOCS - ATTENDING CONTRIBUTION TO CARE
I Rodolfo Gatica MD saw and examined the patient. MLP saw and examined the patient under my supervision. I discussed the care of the patient with MLP and agree with MLP's plan, assessment and care of the patient while in the ED.

## 2018-04-24 NOTE — ED PROVIDER NOTE - MUSCULOSKELETAL, MLM
Was the patient seen in the last year in this department? Yes     Does patient have an active prescription for medications requested? No     Received Request Via: Pharmacy  
Spine appears normal, range of motion is not limited, no muscle or joint tenderness

## 2018-07-27 ENCOUNTER — TRANSCRIPTION ENCOUNTER (OUTPATIENT)
Age: 28
End: 2018-07-27

## 2018-07-31 ENCOUNTER — TRANSCRIPTION ENCOUNTER (OUTPATIENT)
Age: 28
End: 2018-07-31

## 2018-09-18 NOTE — ED BEHAVIORAL HEALTH ASSESSMENT NOTE - THOUGHT CONTENT
Initial Clinical Review    Admission: Date/Time/Statement: 9/17/18 @ 0136   TRANSFER FROM [facility] TO [facility] FOR HIGHER LEVEL OF CARE - MICU ON VENTILATOR WITH IV AMIODARONE AND NONINVASIVE CARDIAC WORKUP NECESSARY  Orders Placed This Encounter   Procedures    Inpatient Admission     Standing Status:   Standing     Number of Occurrences:   1     Order Specific Question:   Admitting Physician     Answer:   Osmany Chen [[id number]]     Order Specific Question:   Level of Care     Answer:   Critical Care [15]     Order Specific Question:   Estimated length of stay     Answer:   More than 2 Midnights     Order Specific Question:   Certification     Answer:   I certify that inpatient services are medically necessary for this patient for a duration of greater than two midnights  See H&P and MD Progress Notes for additional information about the patient's course of treatment  History of Illness: Emily Calderón is a 61 y o  female who present to [facility] ED on night of 9/16/18 for altered mental status of 2 day duration per caregiver  Per EMS and chart review, patient's blood sugar was noted to be above 600  Patient has had fatigue, increased SOB and thirst, and weakness  In the ED, patient was noted to be hypotensive and tachycardic up to 156  Patient found to have a lactic acidosis of 3 4, glucose of 952 with no gap, and acute kidney injury  Sepsis alert was called and patient was given 2L IVF, administered zosyn, and insulin infusion was initiated  Of note, patient recently admitted to [facility] from 9/7/18-9/10/18 with dehydration       ED Vital Signs:   ED Triage Vitals   Temperature Pulse Respirations Blood Pressure SpO2   09/17/18 0200 09/17/18 0200 09/17/18 0200 09/17/18 0200 09/17/18 0200   (!) 101 5 °F (38 6 °C) (!) 130 (!) 34 103/69 93 %      Temp Source Heart Rate Source Patient Position - Orthostatic VS BP Location FiO2 (%)   09/17/18 0200 09/17/18 0200 09/17/18 0200 09/17/18 0200 --   Probe Monitor Lying Left arm       Pain Score       09/17/18 2115       Worst Possible Pain        Wt Readings from Last 1 Encounters:   09/17/18 57 1 kg (125 lb 14 1 oz)     Vital Signs (abnormal):   09/18/18 0615  98 6 °F (37 °C)   112  22  109/64  74  100 %  --   09/18/18 0515  99 °F (37 2 °C)   146   23  102/74  85  100 %  --   09/18/18 0415  99 3 °F (37 4 °C)   146   25  120/84  97  100 %  --   09/18/18 0315  99 3 °F (37 4 °C)   144   23  125/86  96  100 %  --   09/18/18 0215  99 7 °F (37 6 °C)   142   25  126/92  101  100 %  --   09/18/18 0115  99 °F (37 2 °C)   150   23  108/67  80  99 %  --   09/18/18 0015  100 4 °F (38 °C)   150   24  111/70  79  99 %  --   09/17/18 2345   100 8 °F (38 2 °C)   148   25  135/77  95  99 %  --   09/17/18 2332   101 5 °F (38 6 °C)   144   24  117/80  92  96 %  --   09/17/18 2330   101 5 °F (38 6 °C)   138  16   66/51  56  96 %  --   09/17/18 2315   101 8 °F (38 8 °C)   154   26   62/53  58  97 %  --   09/17/18 2300   102 2 °F (39 °C)   138   26   82/54  55  97 %  --   09/17/18 2245  --   152   29   86/52  62  100 %  --   09/17/18 2215  --   156   27  100/74  92  99 %  --   09/17/18 2200   102 6 °F (39 2 °C)   152   25  112/73  98  100 %  --   09/17/18 2115  100 °F (37 8 °C)   146   38   149/113  136  92 %  --   09/17/18 2100  99 7 °F (37 6 °C)   150   44   165/141  152   86 %  --   09/17/18 2000  99 3 °F (37 4 °C)   118   32  134/79  100  94 %  --   09/17/18 1800  98 6 °F (37 °C)   128   32  92/61  83  98 %  --   09/17/18 1700  98 6 °F (37 °C)   116   43  119/83  97  98 %  --   09/17/18 1600  99 °F (37 2 °C)   110   30  95/63  77  99 %  --   09/17/18 1547  99 1 °F (37 3 °C)   113   25  99/63  --  --  --   09/17/18 1200  100 °F (37 8 °C)   150  21  91/74  80  --  --   09/17/18 1100  99 7 °F (37 6 °C)   156  21  96/75  81  93 %  --   09/17/18 1000  99 7 °F (37 6 °C)   158   33  119/65  80   87 %  --   09/17/18 0900  99 7 °F (37 6 °C)   162   27  119/79  93  95 %  --   09/17/18 0800  100 °F (37 8 °C)   138   24  111/79  89   85 %  --   09/17/18 0700  100 °F (37 8 °C)   158   24  143/82  103  99 %  --   09/17/18 0645  100 °F (37 8 °C)   156   28  119/78  102  94 %  --   09/17/18 0630  100 °F (37 8 °C)   156   24  106/85  94  --  --   09/17/18 0615  100 °F (37 8 °C)   150   28  120/68  90  --  --   09/17/18 0600  100 °F (37 8 °C)   138   29  127/79  95  --  --   09/17/18 0530  100 4 °F (38 °C)   128   27  143/86  105  --  --   09/17/18 0515  100 4 °F (38 °C)   136   30  128/89  102  --  --   09/17/18 0500   100 8 °F (38 2 °C)   144   27  119/76  96  --  --   09/17/18 0445   101 1 °F (38 4 °C)   144   30  111/67  78  97 %  --   09/17/18 0430   101 1 °F (38 4 °C)   144   32  94/75  84  97 %  --   09/17/18 0415   101 1 °F (38 4 °C)   140   30  108/64  79  100 %  --   09/17/18 0400   101 1 °F (38 4 °C)   140   34  119/84  96  99 %  None (Room air)   09/17/18 0345   101 1 °F (38 4 °C)   132   32  113/79  92  99 %  --   09/17/18 0330   101 5 °F (38 6 °C)   132   34  99/65  76  100 %  --   09/17/18 0300   101 5 °F (38 6 °C)   140  21  98/58  72  95 %  --   09/17/18 0245   101 8 °F (38 8 °C)   130   35  112/69  77  92 %  --   09/17/18 0230   102 2 °F (39 °C)   156   46  101/59  82  98 %  --   09/17/18 0200   101 5 °F (38 6 °C)   130   34  103/69  93  93 %  None (Room air)     Abnormal Labs:  ON VENTILATOR  09/17/18 2257   pH, Arterial 7 436    pCO2, Arterial 25 7     pO2, Arterial 82 0    HCO3, Arterial 16 9     Base Excess, Arterial -6 4    O2 Content, Arterial 10 8     O2 HGB,Arterial  95 0    SOURCE Radial, Right       9/17       Sodium 121    123 124    124    129      Potassium 4 2 2 8 2 9 3 1 3 0   Chloride 87 90 92 93 96   CO2 16 17 20 20 19   Anion Gap 18 16 12 11 14   BUN 43 37 33 30 28   Creatinine 2 08 1 68 1 44 1 22 1 34   Glucose 639 481 392 358 346   Calcium 7 7 7 4 7 8 7 6 7 7   CALCIUM IONIZED 1 00  1 04     Total Protein   5 2     Albumin   1 5     Phosphorus 1 7  1 6 1 9 0 9      9/17 9/18 Sodium 128 129 134 134   CO2 19 18 18 18   BUN 26 25 25 26   Glucose 185 147 108 164   Calcium 7 3 7 1 7 1 7 0   AST (SGOT)    51   Total Protein    4 8   Albumin    1 4   Phosphorus 1 8 2 3 1 7         9/17     WBC 2 67 2 88 2 22        RBC 3 64 2 72 2 36   Hemoglobin 10 1 7 5 6 5   HCT 27 8 21 5 18 7   MCV 76 79 79   Platelets 75 55 61         9/18   WBC   1 05 1 41      RBC   3 20 3 14   Hemoglobin 6 3 8 3 8 9 8 7   HCT 18 3 24 3 25 7 24 5   MCV   80 78   Platelets   67 74     POC glucose range >500 to 294 since admission   Lactic acid 5 1, 2 7  Haptoglobin 281  Fibrinogen 545  PT/INR 16 2/1 29  PTT 51    Diagnostic Test Results:     9/17 CT chest - 1  Bilateral cavitating pulmonary nodules concerning for septic emboli  Fungal infection also a possibility  Metastatic disease considerably less likely given rapidity of development  2   Small bibasilar pleural effusions noted with mild compressive atelectasis  9/18 MRI eval for epidural abscess/hematoma - pending     9/17 Cardiac Echo EF 55%  LEFT VENTRICLE:  Systolic function was hyperdynamic  VENTRICULAR SEPTUM:  There was dyssynergic motion  These changes are consistent with LBBB  MITRAL VALVE:  There was mild regurgitation  AORTIC VALVE:  The valve was probably trileaflet  Leaflets exhibited normal thickness and normal cuspal separation  TRICUSPID VALVE:  There was mild regurgitation  There was a definite, large, sessile, echogenic, fixed mass on the anterior leaflet, on the right atrial aspect  This may represent a thrombus, tumor or vegetation on tricuspid valve    IVC, HEPATIC VEINS: No obvious thrombus in IVC  REC - JUNIOR     9/17 JUNIOR -pending results     9/18 Cardioversion -     Past Medical/Surgical History:    Active Ambulatory Problems     Diagnosis Date Noted    [facility]A [facility]cerebral vascular accident) [facility] [facility] ) 03/17/2016    Type 2 diabetes mellitus without complication, without long-term current use of insulin ([facility] [address] ) 03/17/2016    [facility]oaffective disorder, [facility] 03/17/2016    Essential hypertension 03/17/2016    Colonic mass 06/19/2018    Type 2 diabetes mellitus with complication (HCC)     CKD (chronic kidney disease) stage 3, GFR 30-59 ml/min     Pain of right upper extremity 06/20/2018    RYLAN (acute kidney injury)  06/20/2018    CAD (coronary artery disease) 06/20/2018    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    Large bowel obstruction ([facility] [address] ) 07/15/2018    Brachial artery stenosis, right (HCC) 07/25/2018    Rectosigmoid mass - High colostomy output 07/28/2018    Leukocytosis 07/28/2018    GERD (gastroesophageal reflux disease) 07/28/2018    Tobacco abuse 07/28/2018    Liver metastasis ([facility] [address] ) 08/03/2018    Mixed hyperlipidemia 08/08/2018    Dizziness - due to orthostatic hypotension 09/07/2018    Ambulatory dysfunction 09/07/2018    Orthostatic hypotension 09/08/2018    Chemotherapy induced nausea and vomiting 09/11/2018    Lower abdominal pain 09/13/2018     Resolved Ambulatory Problems     Diagnosis Date Noted    CAD S/P percutaneous coronary angioplasty 03/17/2016    Bipolar depression [facility] 03/17/2016    Schizophrenia ([facility] ) 03/17/2016    Acute renal failure ([facility] ) 03/17/2016    Hyponatremia 03/17/2016    Heroin abuse 03/18/2016    Lactic acidosis 07/25/2018    Ileus (HCC) 07/25/2018    Right arm numbness - Right brachial artery stenosis 07/25/2018    Diarrhea 07/25/2018    Hyponatremia 07/25/2018     Past Medical History:   Diagnosis Date    Bipolar depression ([facility] 3/17/2016    CAD S/P percutaneous coronary angioplasty 3/17/2016    Cancer ([facility]     Chronic pain     Colonic mass     Colostomy care ([facility] )     COPD (chronic obstructive pulmonary disease) ([facility] [address] )     CVA (cerebral vascular accident) ([facility] )     Essential hypertension 3/17/2016    GERD (gastroesophageal reflux disease)     Hepatitis C     Heroin abuse 3/18/2016    History of gastric ulcer     Hypertension     NSTEMI (non-ST elevated myocardial infarction) ([place] [address] ) 07/2012    Psychiatric disorder 09/03/2014    Schizoaffective disorder ([place] [address] ) 3/17/2016    Schizophrenia ([place] [address] ) 3/17/2016    STEMI (ST elevation myocardial infarction) ([place] [address] ) 12/2017    Type 2 diabetes mellitus ([place] [address] ) 3/17/2016     Admitting Diagnosis: HHNC (hyperglycemic hyperosmolar nonketotic coma) ([place] [address] ) [E11 01]    Age/Sex: 61 y o  female    Assessment/Plan:   1  Encephalopathy  2  RYLAN  3  Hyperglycemia- Likely HHS/ HHNK  4  Elevated lactate level  5  AGMA  6  Hypotension  7  Shock (multifactorial- likely component of hypovolemic and septic)  8  Fever  9  Hyponatremia  10  Leukopenia  11  Anemia  12  Thrombocytopenia  13  Bandemia  14  Hx Advanced colon cancer with possible liver metastasis s/p colostomy currently on chemotx  15  Hx DM2  16  Hx schizophrenia/ schizoaffective disorder  17  Hx HCV     Admit to ICU  Monitor hemodynamics- vasopressor as needed  Respiratory support as clinically indicated  Optimize electrolytes with goal Na 140, K 4 5, Mg 2 and PO4 4  Trend labs  IV fluids  Symptom management  Antibiotics- broad spectrum  Glycemic control with Insulin drip  Home meds as appropriate  Formal ECHO   ICU supportive care      Bedside cardiac US shows IVC respirophasic variation and also concern for TV mass/ vegetation        Admission Orders:  Scheduled Meds:   Current Facility-Administered Medications:  acetaminophen 650 mg Per NG Tube Q6H PRN    amiodarone 1 mg/min Intravenous Continuous Last Rate: 1 mg/min (09/18/18 4494)   Followed by       amiodarone 0 5 mg/min Intravenous Continuous    cefazolin 2,000 mg Intravenous Q8H Last Rate: 2,000 mg (09/18/18 1122)   chlorhexidine 15 mL Swish & Spit Q12H [address]    heparin (porcine) 3-20 Units/kg/hr (Order-Specific) Intravenous Titrated Last Rate: Stopped (09/17/18 2126)   heparin (porcine) 1,650 Units Intravenous PRN    heparin (porcine) 3,300 Units Intravenous PRN    insulin regular (HumuLIN R,NovoLIN R) infusion 0 3-21 Units/hr Intravenous Titrated Last Rate: 3 Units/hr (09/18/18 1111)   potassium chloride 20 mEq Intravenous Once    sodium chloride 150 mL/hr Intravenous Continuous Last Rate: 150 mL/hr (09/18/18 0449)   vancomycin 20 mg/kg Intravenous Q24H Last Rate: 1,250 mg (09/18/18 1022)     Continuous Infusions:   amiodarone 1 mg/min Last Rate: 1 mg/min (09/18/18 3220)   Followed by     amiodarone 0 5 mg/min    heparin (porcine) 3-20 Units/kg/hr (Order-Specific) Last Rate: Stopped (09/17/18 2126)   insulin regular (HumuLIN R,NovoLIN R) infusion 0 3-21 Units/hr Last Rate: 3 Units/hr (09/18/18 1111)   sodium chloride 150 mL/hr Last Rate: 150 mL/hr (09/18/18 0449)     PRN Meds:   Acetaminophen x2    heparin (porcine) 1650 U x 1    MICU   Mechanical ventilation w/ weaning protocol     Diet NPO   Cons ID   Cons Cardiology   POC glucose q 2 hr  Neuro checks q 4 h  Heparin drip ACS algorithm  BMP, H/H, Mag, Phos, q 6 hrs  _________________________  9/17 Cardiology Consult   Atrial flutter:  Amiodarone might have been started in the setting of hemodynamic instability, cardioversion would be with risk of embolic events (especially since she is already high risk without prior history of thrombus/thromboembolic episode), considering that the patient has been stable, discontinue IV amiodarone, start IV metoprolol 5 mg every 6 hr   IV Cardizem can also be initiated overnight if necessary  If heart rates increase in spite of metoprolol or blood pressures preclude metoprolol, at that point okay to restart IV amiodarone  She will be undergoing a JUNIOR anyway, rule out thrombus and can perform cardioversion at the same time and put her on amiodarone in the short-term till her acute issues resolve     Tricuspid valve 'mass'/Infective Endocarditis:  Likely vegetation in the setting of gram-positive cocci in clusters on blood cultures-2/2, septic emboli on CT, no prior history of IV drug use, has snorted cocaine    She will need her port removed, ID consultation is awaited, schedule for JUNIOR also  Not a candidate for surgery, only with mild regurgitation on echo     Encephalopathy:  Likely secondary to sepsis     Adenocarcinoma of the colon:  Status post bowel resection and colostomy, received full Handy chemotherapy, might need to be held in the setting of long-term antibiotic use for endocarditis     Gerald on CKD:  Mild, continue to follow  ____________________  9/17 ID Consult   This is a 22-year-old female with a complicated past medical history who presents for altered mental status and severe sepsis, likely secondary to her Gram-positive bacteremia and possible right-sided endocarditis      1   Gram-positive bacteremia:  Possible sources include newly diagnosed needed valve endocarditis and potentially infected chemo port  -blood cultures from last night are positive x2 for gram-positive cocci in clusters  -will plan to repeat blood cultures tomorrow  -transthoracic echo reviewed, cardiology following; the patient is for transesophageal echo tomorrow  -no other localizing symptoms on exam at this point  -will place the patient on vancomycin and cefazolin; stop cefepime  This will provide coverage for both MRSA or MSSA or enterococcal endocarditis and bacteremia until the organism is identified  -will continue to follow CBC and chemistry daily  -will plan to repeat blood cultures every 24-48 hours  -would recommend removal of femoral line as soon as feasible in the setting of Gram-positive bacteremia given high risk of infection associated with them       2  Suspected endocarditis  -echo reviewed inpatient for transesophageal echo tomorrow  -will continue plan as above     3  Severe sepsis, POA:  -this is likely due to problems 1 and 2  -will continue management as above     4    Colon cancer currently on palliative chemotherapy with chronic port  -review chart further try to determine the age of her port  -will attempt to contact her partner to see if there are any issues with her most recent chemotherapy session, or issues at home with her line  -if the patient isolates MRSA, it is very likely we will need to remove the port, and could possibly be replaced after she completes antibiotic therapy  -unclear what the plans are for chemotherapy and recommend discussing this with Oncology     5  Diabetes  -patient presented with elevated blood sugars  -current care as per primary team; would recommend rechallenge her partner to understand what care she has been receiving at home      [facility] Utilization Review Department  Phone: [phone]; Fax [phone]  ATTENTION: Please call with any questions or concerns to [phone]  and carefully follow the prompts so that you are directed to the right person  Send all requests for admission clinical reviews, approved or denied determinations and any other requests to fax [phone]   All voicemails are confidential  Unremarkable

## 2018-10-01 ENCOUNTER — OUTPATIENT (OUTPATIENT)
Dept: OUTPATIENT SERVICES | Facility: HOSPITAL | Age: 28
LOS: 1 days | End: 2018-10-01

## 2018-11-02 ENCOUNTER — TRANSCRIPTION ENCOUNTER (OUTPATIENT)
Age: 28
End: 2018-11-02

## 2018-11-21 NOTE — ED PROVIDER NOTE - CPE EDP SKIN NORM
Message   Patient states he is still having headaches and would like to know if any additional treatment or testing is recommended. He states headaches are worse. Per Dr. Gaona, take OTC Tylenol. If not helping may start on Nortriptyline 10mg one tab at bedtime for 7 days then increase to 20mg at bedtime. Patient states he does use Tylenol OTC and helps some. He would like to start nortriptyline. Rx sent        Signatures   Electronically signed by : Jazmine Cardenas CMA; Alireza 15 2018  4:38PM CST    
Message   Pt. called states he has done MRI and is going to start physical therapy,   still having terrible headaches.  will also being a psychiatrist about depression and anxiety.    [phone]     Jazmine Cardenas - 12 Jan 2018 4:35 PM   Message left for patient to call the office.     Jazmine Cardenas - 15 Alireza 2018 10:08 AM   Message left for patient to call the office.        Signatures   Electronically signed by : Jazmine Cardenas CMA; Alireza 15 2018 10:11AM CST    
normal...

## 2018-12-29 NOTE — ED ADULT NURSE NOTE - CAS DISCH ACCOMP BY
Patient on Day 4 of antibiotics for pneumonia. Patient has been afebrile and blood cultures continue to show no growth. Spoke with physician and suggested adding a stop date to the antibiotics. Stop date entered for tomorrow.
Patient on day 4 of IV azithromycin and ceftriaxone for pneumonia. Spoke with physician and recommended switching to PO azithromycin.
Self

## 2018-12-30 ENCOUNTER — TRANSCRIPTION ENCOUNTER (OUTPATIENT)
Age: 28
End: 2018-12-30

## 2019-01-03 ENCOUNTER — OUTPATIENT (OUTPATIENT)
Dept: OUTPATIENT SERVICES | Facility: HOSPITAL | Age: 29
LOS: 1 days | End: 2019-01-03

## 2019-08-02 ENCOUNTER — APPOINTMENT (OUTPATIENT)
Dept: INTERNAL MEDICINE | Facility: CLINIC | Age: 29
End: 2019-08-02
Payer: SELF-PAY

## 2019-08-02 DIAGNOSIS — F32.9 ANXIETY DISORDER, UNSPECIFIED: ICD-10-CM

## 2019-08-02 DIAGNOSIS — F41.9 ANXIETY DISORDER, UNSPECIFIED: ICD-10-CM

## 2019-08-02 PROBLEM — G43.A0 CYCLICAL VOMITING, IN MIGRAINE, NOT INTRACTABLE: Chronic | Status: ACTIVE | Noted: 2017-02-22

## 2019-08-02 PROBLEM — K21.9 GASTRO-ESOPHAGEAL REFLUX DISEASE WITHOUT ESOPHAGITIS: Chronic | Status: ACTIVE | Noted: 2018-03-13

## 2019-08-02 PROBLEM — K27.9 PEPTIC ULCER, SITE UNSPECIFIED, UNSPECIFIED AS ACUTE OR CHRONIC, WITHOUT HEMORRHAGE OR PERFORATION: Chronic | Status: ACTIVE | Noted: 2018-03-13

## 2019-08-02 PROCEDURE — 99499E: CUSTOM

## 2019-08-02 RX ORDER — BUPROPION HYDROCHLORIDE 100 MG/1
100 TABLET, FILM COATED, EXTENDED RELEASE ORAL
Qty: 30 | Refills: 0 | Status: DISCONTINUED | COMMUNITY
Start: 2019-03-04

## 2019-08-02 RX ORDER — QUETIAPINE FUMARATE 200 MG/1
200 TABLET ORAL
Qty: 30 | Refills: 0 | Status: DISCONTINUED | COMMUNITY
Start: 2017-05-22 | End: 2019-08-02
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
## 2019-08-30 ENCOUNTER — APPOINTMENT (OUTPATIENT)
Dept: INTERNAL MEDICINE | Facility: CLINIC | Age: 29
End: 2019-08-30
Payer: SELF-PAY

## 2019-08-30 ENCOUNTER — LABORATORY RESULT (OUTPATIENT)
Age: 29
End: 2019-08-30

## 2019-08-30 PROCEDURE — 99499D: CUSTOM

## 2019-08-30 RX ORDER — FLUOXETINE HYDROCHLORIDE 20 MG/1
20 CAPSULE ORAL
Qty: 30 | Refills: 1 | Status: ACTIVE | COMMUNITY
Start: 2019-08-02 | End: 1900-01-01

## 2019-08-30 RX ORDER — BUPRENORPHINE HYDROCHLORIDE 8 MG/1
8 TABLET SUBLINGUAL 3 TIMES DAILY
Qty: 90 | Refills: 0 | Status: ACTIVE | COMMUNITY
Start: 2017-06-05 | End: 1900-01-01

## 2019-08-30 RX ORDER — GABAPENTIN 800 MG/1
800 TABLET, COATED ORAL
Qty: 90 | Refills: 1 | Status: ACTIVE | COMMUNITY
Start: 2017-07-18 | End: 1900-01-01

## 2019-09-09 LAB — SUBOXONE: NORMAL

## 2019-09-13 LAB
AMPHET UR-MCNC: NEGATIVE
BARBITURATES UR-MCNC: NEGATIVE
BENZODIAZ UR-MCNC: NEGATIVE
COCAINE METAB.OTHER UR-MCNC: NEGATIVE
CREATININE, URINE: 181.9 MG/DL
METHADONE UR-MCNC: NEGATIVE
METHAQUALONE UR-MCNC: NEGATIVE
OPIATES UR-MCNC: NEGATIVE
PCP UR-MCNC: NEGATIVE
PROPOXYPH UR QL: NEGATIVE
THC UR QL: NORMAL

## 2019-10-19 LAB
EXTERNAL HEMATOCRIT: 40 %
EXTERNAL HEMOGLOBIN: 14.1 G/DL
EXTERNAL PLATELET COUNT: 224 K/ΜL
EXTERNAL WBC: 11 G/DL

## 2019-12-03 NOTE — ED PROVIDER NOTE - CROS ED CARDIOVAS ALL NEG
HPI:   Mirian Morales is a 27year old female who presents evaluation and management of vaginal discharge that seems to be present 3 weeks of the month since July. Pt does not feel there is often an odor and only occasionally does she have some burning. HPI.    EXAM:     VITALS: /80 (BP Location: Right arm, Patient Position: Sitting, Cuff Size: adult)   Pulse 80   Temp 98.1 °F (36.7 °C) (Oral)   Resp 12   Ht 66.25\"   Wt 133 lb (60.3 kg)   LMP 11/10/2019 (Exact Date)   Breastfeeding No   BMI 21.31 k negative...

## 2020-02-20 LAB — TSH SERPL DL<=0.05 MIU/L-ACNC: 4.36 MIU/L

## 2020-04-08 DIAGNOSIS — F11.20 OPIOID DEPENDENCE, UNCOMPLICATED: ICD-10-CM

## 2020-06-05 ENCOUNTER — BMR PREADMISSION ASSESSMENT (OUTPATIENT)
Dept: ADMISSIONS | Facility: REHABILITATION | Age: 30
End: 2020-06-05

## 2020-06-05 PROBLEM — T50.901A DRUG OVERDOSE: Status: ACTIVE | Noted: 2020-06-05

## 2020-06-05 PROBLEM — G93.1 ANOXIC BRAIN INJURY (CMS/HCC): Status: ACTIVE | Noted: 2020-06-05

## 2020-06-05 PROBLEM — Z86.74 H/O CARDIAC ARREST: Status: ACTIVE | Noted: 2020-06-05

## 2020-06-05 PROBLEM — F39 MOOD DISORDER (CMS/HCC): Status: ACTIVE | Noted: 2020-06-05

## 2020-06-09 PROBLEM — Z93.1 S/P PERCUTANEOUS ENDOSCOPIC GASTROSTOMY (PEG) TUBE PLACEMENT (CMS/HCC): Status: ACTIVE | Noted: 2020-06-09

## 2020-06-10 VITALS
TEMPERATURE: 97.6 F | SYSTOLIC BLOOD PRESSURE: 124 MMHG | DIASTOLIC BLOOD PRESSURE: 70 MMHG | HEART RATE: 78 BPM | WEIGHT: 171.96 LBS | OXYGEN SATURATION: 99 % | BODY MASS INDEX: 24.07 KG/M2 | HEIGHT: 71 IN

## 2020-06-10 PROBLEM — R56.9 SEIZURE (CMS/HCC): Status: ACTIVE | Noted: 2020-06-10

## 2020-06-10 PROBLEM — I61.9 NONTRAUMATIC INTRACEREBRAL HEMORRHAGE (CMS/HCC): Status: ACTIVE | Noted: 2020-06-10

## 2020-06-10 PROBLEM — L21.9 SEBORRHEIC DERMATITIS: Status: ACTIVE | Noted: 2020-06-10

## 2020-06-10 PROBLEM — M21.371 FOOT DROP, BILATERAL: Status: ACTIVE | Noted: 2020-06-10

## 2020-06-10 PROBLEM — Z98.2 S/P VENTRICULOPERITONEAL SHUNT: Status: ACTIVE | Noted: 2020-06-10

## 2020-06-10 PROBLEM — H54.8 LEGAL BLINDNESS: Status: ACTIVE | Noted: 2020-06-10

## 2020-06-10 PROBLEM — M21.372 FOOT DROP, BILATERAL: Status: ACTIVE | Noted: 2020-06-10

## 2020-06-10 RX ORDER — ZINC SULFATE 50(220)MG
220 CAPSULE ORAL DAILY
COMMUNITY

## 2020-06-10 RX ORDER — CABERGOLINE 0.5 MG/1
0.5 TABLET ORAL 2 TIMES WEEKLY
COMMUNITY

## 2020-06-10 RX ORDER — ACETAMINOPHEN 500 MG
1000 TABLET ORAL EVERY 8 HOURS PRN
COMMUNITY

## 2020-06-10 RX ORDER — SENNOSIDES 8.6 MG/1
2 TABLET ORAL NIGHTLY
COMMUNITY

## 2020-06-10 RX ORDER — HYDROCORTISONE 10 MG/ML
1 LOTION TOPICAL 2 TIMES DAILY
COMMUNITY

## 2020-06-10 RX ORDER — BACLOFEN 10 MG/1
10 TABLET ORAL 3 TIMES DAILY
COMMUNITY

## 2020-06-10 RX ORDER — KETOCONAZOLE 20 MG/ML
1 SHAMPOO, SUSPENSION TOPICAL 2 TIMES WEEKLY
COMMUNITY

## 2020-06-10 RX ORDER — ESCITALOPRAM OXALATE 10 MG/1
10 TABLET ORAL DAILY
COMMUNITY

## 2020-06-10 RX ORDER — DOCUSATE SODIUM 100 MG/1
100 CAPSULE, LIQUID FILLED ORAL 2 TIMES DAILY
COMMUNITY

## 2020-06-10 RX ORDER — FOLIC ACID 1 MG/1
1 TABLET ORAL DAILY
COMMUNITY

## 2020-06-10 RX ORDER — TALC
3 POWDER (GRAM) TOPICAL NIGHTLY
COMMUNITY

## 2020-06-10 RX ORDER — AMANTADINE HYDROCHLORIDE 100 MG/1
200 CAPSULE, GELATIN COATED ORAL 2 TIMES DAILY
COMMUNITY

## 2020-06-10 NOTE — HOSPITAL COURSE
"30y m originally admitted to [facility] in [place] 9/8/19 s/p PEA arrest attributed to drug OD. 3 rds of CPR/Epi. PEG placed 10/4 (dysphagia improved/resolved and current diet is Reg with thins). Anoxic bi, nontraumatic ICH, hemmorhagic brainstem stroke,Hydrocephalus-VPS placed 10/7/19. +Seizures and Visual deficits/legally blind. Initially low level anoxic BI. Tx'd to [facility] 10/19/19 where he received TONYA and tx'd into a long term care bed.    BI- c/w Symmetrel. Tone/muscle spasms-c/w Baclofen. MDD-c/w Lexapro, psych consults 3-5xper mos.   Neuro opth consult 5/18: Severe BI d/t non traumatic ICH. Evidence on exam of mid brain dysfunction prob. D/t stroke with downward gaze deviation, paresis upward gaze, sluggish pupils OU ad nystagmus. Poss. bilat retrobulbar optic neuropathy based on pupils. By MRI there does not appear to be a cortical component. Signif astegmatism. Rx for glasses given. Considered legally blind.   O/P EEG appt 5/21:       Per PT/OT: continues to present with increased tone all ext's, bilat. Foot drop, signif muscle weakness and impaired ability to transfer/amb (Max a x2 tx and amb max a x2 with bilat. AFO, Darco's RW ). BUE tremorExtensive a x1 for all self care tasks, max a x2 for toilet tx and clothing mgmt/fredo care. Max cues needed for visual scanning-pt \"legally blind\". Fluctuating willingness to participate, decreased initiation and min. verbal.  AAOx2. Able to follow commands. Poor attention to task, at times confused and difficult to redirect. Will speak in sentences, answers questions and converses with family via face time  Supportive family. Therapists rec. Acute IRF.     5/26 COVID Swab neg.   "

## 2020-06-11 NOTE — BMR PREADMISSION NOTE
[facility]    Patient Name: Artur Parker  YOB: 1990    Referral Date: 06/04/20  Evaluation Date: 06/11/20  Referring Facility Admission Date: 10/18/19(Admitted to [facility])  [facility]  ReferringProvider:  Dr. Rufino Churchill    Reason for Referral: Artur Parker is a 30 y.o. male whose primary indication for inpatient rehabilitation is Brain Injury.   Pertinent History of Current Functional Problem:  30y m originally admitted to [facility] in [place] 9/8/19 s/p PEA arrest attributed to drug OD. 3 rds of CPR/Epi. PEG placed 10/4 (dysphagia improved/resolved and current diet is Reg with thins). Anoxic bi, nontraumatic ICH, hemmorhagic brainstem stroke,Hydrocephalus-VPS placed 10/7/19. +Seizures and Visual deficits/legally blind. Initially low level anoxic BI. Tx'd to [facility] 10/19/19 where he received TONYA and tx'd into a long term care bed.    BI- c/w Symmetrel. Tone/muscle spasms-c/w Baclofen. MDD-c/w Lexapro, psych consults 3-5xper mos.   Neuro opth consult 5/18: Severe BI d/t non traumatic ICH. Evidence on exam of mid brain dysfunction prob. D/t stroke with downward gaze deviation, paresis upward gaze, sluggish pupils OU ad nystagmus. Poss. bilat retrobulbar optic neuropathy based on pupils. By MRI there does not appear to be a cortical component. Signif astegmatism. Rx for glasses given. Considered legally blind.   O/P EEG appt 5/21:       Per PT/OT: continues to present with increased tone all ext's, bilat. Foot drop, signif muscle weakness and impaired ability to transfer/amb (Max a x2 tx and amb max a x2 with bilat. AFOHawk's RW ). BUE tremorExtensive a x1 for all self care tasks, max a x2 for toilet tx and clothing mgmt/fredo care. Max cues needed for visual scanning-pt \"legally blind\". Fluctuating willingness to participate, decreased initiation and min. verbal.  AAOx2. Able " to follow commands. Poor attention to task, at times confused and difficult to redirect. Will speak in sentences, answers questions and converses with family via face time  Supportive family. Therapists rec. Acute IRF.     5/26 COVID Swab neg.     Active Medical Conditions:  Patient Active Problem List   Diagnosis   • Anoxic brain injury (CMS/HCC)   • Mood disorder (CMS/HCC)   • H/O cardiac arrest   • Drug overdose   • S/P percutaneous endoscopic gastrostomy (PEG) tube placement (CMS/HCC)   • S/P ventriculoperitoneal shunt   • Foot drop, bilateral   • Seizure (CMS/HCC)   • Nontraumatic intracerebral hemorrhage (CMS/HCC)   • Seborrheic dermatitis   • Legal blindness       Active Medications:  Active Medications   Medication Sig Dispense Refill   • acetaminophen (TYLENOL) 500 mg tablet Take 1,000 mg by mouth every 8 (eight) hours as needed for mild pain.     • amantadine (SYMMETREL) 100 mg capsule Take 200 mg by mouth 2 (two) times a day.     • baclofen (LIORESAL) 10 mg tablet Take 10 mg by mouth 3 (three) times a day.     • cabergoline (DOSTINEX) 0.5 mg tablet Take 0.5 mg by mouth 2 (two) times a week (Mon, Thu).     • docusate sodium (COLACE) 100 mg capsule Take 100 mg by mouth 2 (two) times a day.     • escitalopram (LEXAPRO) 10 mg tablet Take 10 mg by mouth daily.     • folic acid (FOLVITE) 1 mg tablet Take 1 mg by mouth daily.     • hydrocortisone 1 % lotion Apply 1 application topically 2 (two) times a day. To scalp above ad around back of neck     • ketoconazole (NIZORAL) 2 % shampoo Apply 1 application topically 2 (two) times a week (Mon, Thu). Apply to damp skin, lather, leave on 5 minutes, and rinse     • melatonin 3 mg tablet Take 3 mg by mouth nightly.     • senna (SENOKOT) 8.6 mg tablet Take 2 tablets by mouth nightly.     • zinc sulfate (ZINCATE) 220 (50) mg capsule Take 220 mg by mouth daily.     No medication comments found.    HISTORY:    Past Medical History:   Diagnosis Date   • Infectious viral  hepatitis     Hep C   • Substance abuse (CMS/[facility])     polysubstance abuse      No past surgical history on file.  Tobacco Use as of 6/5/2020     Smoking Status Smoking Start Date Smoking Quit Date Packs/Day Years Used    Unknown If Ever Smoked -- -- -- --    Types Comments Smokeless Tobacco Status Smokeless Tobacco Quit Date Source    -- -- Unknown -- Provider            Alcohol Use as of 6/5/2020     Alcohol Use Drinks/Week Alcohol/Week Comments Source    Yes -- -- Prior to Drug OD/initial hosptalization Provider    Frequency Standard Drinks Binge Drinking        -- -- --              Drug Use as of 6/5/2020     Drug Use Types Frequency Comments Source    Yes  Cocaine, Oxycodone -- -- Provider            Sexual Activity as of 6/5/2020     Sexually Active Birth Control Partners Comments Source    Defer -- -- -- Provider            Social Documentation as of 6/5/2020    Lucia Parker Mother [phone]  Sandro Parker-Father [phone]  Source: Provider           Socioeconomic as of 6/5/2020     Marital Status Spouse Name Number of Children Years Education Education Level Preferred Language Ethnicity Race Source    Single -- -- -- -- English -- White Provider    Financial Resource Strain Food Insecurity: Worry Food Insecurity: Inability Transportation Needs: Medical Transportation Needs: Non-medical    -- -- -- -- --             Allergies  Allergies   Allergen Reactions   • Morphine    • Narcan [Naloxone]             Premorbid Functional Status:   Ambulation: independent  Transferring: independent  Toileting: independent  Bathing: independent  Dressing: independent  Eating: independent  Communication: understands/communicates without difficulty  Swallowing: swallows foods/liquids without difficulty  Baseline Diet/Method of Nutritional Intake: thin liquids;regular solids;no diet restrictions  Equipment Currently Used at Home: none  Prior Level of Function Comment: Independ. PTH           Living Environment:  Living  "Arrangements: apartment      TEST RESULTS:     Chemistry (Up to last 3 results from the past 720 hours)    None      Hepatic (Up to last 3 results from the past 720 hours)    None      Metabolic (Up to last 3 results from the past 720 hours)    None      Hematologic (Up to last 3 results from the past 720 hours)    None      Other (Up to last 3 results from the past 720 hours)    None         Diagnostics  Diagnostics: MRI, Other  MRI Study Details: MRI Brain (requested prior to Neuro Opth appt)   MRI Date: 03/20/20  MRI Result: Stable position of R frontal approach  shunt catheter. No area of restricted diffusion to suggest acute infarction.  Area of L frontal and subtle motion gliosis, stable from most recent prior though moderately decreased in size from 9/2019. Mild prominence from ventricles, stable in comparison to prior.  No mass affect, midline shift or focal parenchymal abnormality. No ICH or extra axial collection.  No enhancing occipital lesion. Normal orbits.  Other Study Details: F/U EEG   Other Date: 05/21/20  Other Result: Therapy Director notes \"unremarkable\", however still waiting for report     ASSESSMENT:       Vitals:           Lines/Drains/Airways:   Lines, Drains & Airways  Lines, Drains & Airways: Gastrostomy/Enterostomy, Urinary Catheter, External, Wound  Gastrostomy/Enterostomy Insertion Date: 10/04/19(Flushed daily with 30cc H2O)  Urinary Catheter, External Insertion Date: ([place] cath daily)  Wound Comments: facial seborrheic dermatitis resolved         Risk for Clinical Complications:  DVT: Moderate  Falls: Moderate  Skin Breakdown: Moderate         Precautions:  No data recorded         Current Diet:   Diet: thin liquids, regular solids         Current Functional Status:  Preadmission Current Function     Row Name 06/09/20 1000       Cognition/Psychosocial    Comment, Cognition  Mod impairment of cog lingustic abilities       Auditory Comprehension    Follows Commands (Auditory " "Comprehension)  2-step commands;other (see comments) 85% with much encouragement and cuing       Verbal Expression    Comment, Assesment (Verbal Expression)  increase in amt of cuing in order to communicate and initiate task. Often responds '\"I dont know\"       Swallowing Recommendations    Diet Consistency Recommendations  thin liquids;regular solids       Basic Activities of Daily Living (BADLs)    Basic Activities of Daily Living  upper body dressing;lower body dressing;toileting;self-feeding       Lower Body Dressing    [place]  maximum assist (25-49% patient effort);2 person assist       Toileting    [place]  2 person assist;maximum assist (25-49% patient effort)    Comment  Max a x2 tx, clothing mgmt and fredo care       Self-Feeding    [place]  verbal cues;maximum assist (25-49% patient effort)    Setup Assistance  prepare tray/open items;cut meat/food    Comment  max cues for visual scanning for item identification on tray during measls and occasional hand over hand assist to bring spoon to mouth.        Bed Mobility    [place], Roll Left  verbal cues;close supervision    Verbal Cues (Roll Left)  technique reminders to use sr    [place], Roll Right  close supervision;verbal cues    [place], Supine to Sit  minimum assist (75% or more patient effort)    Comment (Bed Mobility)  Poor trunk stability and sitting balance noted and is depend. on participation. New orders to include AFO schedule during night.        Chair to Bed Transfer    [place], Chair to Bed  maximum assist (25-49% patient effort);2 person assist    Assistive Device  walker, 4-wheeled Bilat [place] shoes and AFO's       Gait Training    [place], Gait  maximum assist (25-49% patient effort);2 person assist    Assistive Device  walker, 4-wheeled    Distance in Feet  -- short distance    Deviations/Abnormal Patterns (Gait)  scissoring;step length decreased;other (see comments) foot drop    Comment  Amb with " bilat darco shoes/AFO's, wc follow and RW support. Foot drop coninues to impair ability to clear the ground with each step. Decline has been observed d/t previously was amb. 100ft. Lack of motivation to participate in gait training has impacted his ability to amb safely. He currently exhibits excessive bilat hip and knee flexion during amb and he does not bear weight through his BUE approp despite extensive cuing. Consut currently scheduled with orthotics specilalist to fabricate new AFO's with the aim of facilitating more functional ambulation.                Support System:   Designated Primary Caregiver: MotherLashawn           Patient/Family Goals:             Educational Background:      RECOMMENDATIONS / PLAN:       Special Needs:            Plan:  Identified Referral Needs: occupational therapy, speech language pathology, physical therapy, psychiatry/psychology services, neuropsychologist  OT Frequency: 5-7 times per week  OT Intensity: 1.5 hours  PT Frequency: 5-7 times per week  PT Intensity: 1 hour  SLP Frequency: 5-7 times per week  SLP Intensity: 0.5 hours  Impairments to be addressed: cognitive function, communication, mobility, motor dysfunction, safety, self-care    Medical Necessity Admission Criteria: confusion/cognitive issues, PEG tube, hydrocephalus, seizure disorder  Therapy Intensity: Requires, can tolerate and will benefit from 3 hours of therapy at least 5 days per week  Projected Length of Stay (days): 28 days  Patient is willing to participate in rehab program: yes         Expected Level of Function at Discharge:  Expected Functional Improvement: cognitive function;other (see comments);communication;mobility;motor dysfunction;safety;self-care  Self-Care: Partial/moderate assistance  Sphincter Control: Partial/moderate assistance  Transfers: Partial/moderate assistance  Locomotion: Partial/moderate assistance  Communication: Supervision or touching assistance  Social Cognition:  "Supervision or touching assistance           Post-Discharge Needs:  Anticipated Discharge Disposition: community-based residential facility (CBRF)  Discharge Coordination/Progress: Pt approved for BI Waiver/\"SILO Program\", has been assigned a CM and dispo plan is d/c to girlfriend Lynda's home in [facility] with 24h/7 HHA thru SILO program for 2-3 mos until approp apt. secured in [place].     "

## 2020-08-25 NOTE — ED ADULT TRIAGE NOTE - CCCP TRG CHIEF CMPLNT
Referral received from Dr. Stern for Dr. Larsne's evaluation of patient's cervical radiculopathy and upper extremity weakness. Called patient and left multiple messages for patient to contact the office to schedule an appointment. Per PCP notes, patient works until 3:30pm. Appointment held on 9/14/20 at 4pm for patient with Dr. Larsen. Advised patient to call back if she wishes to accept that appointment date and time.  
anxiety

## 2020-11-02 NOTE — ED STATDOCS - DATE/TIME 1
pleasant, well nourished, well developed, in no acute distress , normal communication ability
04-Jun-2017 11:46

## 2020-11-03 NOTE — ED BEHAVIORAL HEALTH ASSESSMENT NOTE - NICOTINE
Emily Ram states pain to tailbone s/p fall has improved. Other pain in body is status quo. No new symptoms to report.  Routed to Dr. Vlad Kelly as Tj Fraction None known

## 2020-11-04 NOTE — ED STATDOCS - ATTEMPT TO OOB
no
MEDICATIONS  (STANDING):  apixaban 5 milliGRAM(s) Oral every 12 hours  artificial tears (preservative free) Ophthalmic Solution 1 Drop(s) Both EYES three times a day  atorvastatin 10 milliGRAM(s) Oral at bedtime  benzocaine 15 mG/menthol 3.6 mG (Sugar-Free) Lozenge 1 Lozenge Oral two times a day  dextrose 50% Injectable 12.5 Gram(s) IV Push once  dextrose 50% Injectable 25 Gram(s) IV Push once  dextrose 50% Injectable 25 Gram(s) IV Push once  diltiazem    Tablet 90 milliGRAM(s) Oral four times a day  gabapentin 300 milliGRAM(s) Oral three times a day  insulin lispro (ADMELOG) corrective regimen sliding scale   SubCutaneous three times a day before meals  insulin lispro (ADMELOG) corrective regimen sliding scale   SubCutaneous at bedtime  lisinopril 20 milliGRAM(s) Oral daily  ofloxacin 0.3% Solution 10 Drop(s) Right Ear daily  pantoprazole    Tablet 40 milliGRAM(s) Oral before breakfast  sertraline 25 milliGRAM(s) Oral daily  sotalol 80 milliGRAM(s) Oral two times a day  tiotropium 18 MICROgram(s) Capsule 1 Capsule(s) Inhalation daily    MEDICATIONS  (PRN):  ALBUTerol    90 MICROgram(s) HFA Inhaler 2 Puff(s) Inhalation every 6 hours PRN Shortness of Breath and/or Wheezing  dextrose 40% Gel 15 Gram(s) Oral once PRN Blood Glucose LESS THAN 70 milliGRAM(s)/deciliter  glucagon  Injectable 1 milliGRAM(s) IntraMuscular once PRN Glucose LESS THAN 70 milligrams/deciliter

## 2021-01-26 NOTE — ED BEHAVIORAL HEALTH ASSESSMENT NOTE - MEDICAL RECORD REVIEWED
Pt states that she is  Receiving her first dose of the covid vaccine tormorow and wondered If current medication she taking will effect vaccine?      Please call pt to discuss        
 Although none of the vaccines have been specifically tested in people with every diagnosis or on every medication, they are likely to be safe based on the fact that the regulatory bodies have licensed or will license these vaccines for their safety and efficacy profile in the general population. The majority of the COVID-19 vaccines, including the mRNA and adenovirus vector vaccines, DO NOT contain live infectious agents.  She carries the same risk of reaction as the general population.           
Patient asking if her medication will have any effect with her receiving the covid vaccine tomorrow.    Currently taking carbidopa/levodopa.  
Spoke with patient and relayed below message.    
Yes

## 2021-03-05 NOTE — ED ADULT NURSE NOTE - OBJECTIVE STATEMENT
pt states he has history of panic attacks and complaings of si.  pt states that he was brought in by police who was called by his mother. No

## 2021-03-15 NOTE — ED ADULT NURSE NOTE - TOBACCO SCREENING (FROM THE ASSIST)
Addended by: NATHAN PEREZ on: 3/15/2021 02:16 PM     Modules accepted: Orders     Statement Selected

## 2021-05-04 NOTE — ED PROVIDER NOTE - HEAD, MLM
Post-Care Instructions: Patient instructed to not lie down for 4 hours and limit physical activity for 24 hours. Patient instructed not to travel by airplane for 48 hours. Head is atraumatic. Head shape is symmetrical.

## 2021-09-20 NOTE — ED BEHAVIORAL HEALTH ASSESSMENT NOTE - ACCESS TO FIREARM
Report given to Ayanna WATSON. RN aware piv needs to be infusing at a kvo to avoid clotting. RN aware pt has not gotten protonix due to prbc infusing. Pt resting on cart. Breathing easy and non labored, no distress note. Vss. Pt appropriate for transfer to floor. piv infusing prbc. No redness or swelling noted. Parents cartside.    No

## 2021-11-26 NOTE — ED ADULT TRIAGE NOTE - WEIGHT IN LBS
Urinal provided and instructed wife if he would urinate a sample is needed.      Rachel Calvert, RN  11/26/21 1111 179.8

## 2021-12-28 NOTE — ED ADULT TRIAGE NOTE - HEIGHT IN FEET
Outreach attempt was made to schedule a Medicare Wellness Visit. This was the first attempt. Contact was made, MWV appointment scheduled.   6

## 2022-01-28 NOTE — ED PROVIDER NOTE - SKIN, MLM
Patient : Erica Vela Age: 49 year old Sex: female   MRN: [id number] Encounter Date: 1/28/2022      History     Chief Complaint   Patient presents with   • Leg Pain     HPI   49-year-old otherwise healthy female presenting to the ED with left ankle pain.  About 10 days ago, patient stepped into a hole.  She was able to walk after.  For the next few days, she was limping and had some pain on either side of her ankle.  Over the last few days, the pain has resolved but she has been getting swelling in the same areas.  There is no numbness or weakness.  She is able to walk on it, but when it swells she limps more so.  She also had some discomfort in the left lower leg, but able to move the hip and knee fine.  No foot pain. She said because of the depth of the hole, she had also hit right side of her head on the wall next to it. There was no loss of consciousness or vomiting.  She has not take blood thinners.  No otorrhea rhinorrhea.  She has been having intermittent headaches.  None currently.  No dizziness or lightheadedness.  No neck pain or stiffness    She has not taken any meds for this.  Has not used any braces or compression.      Allergies   Allergen Reactions   • Bee ANAPHYLAXIS       There are no discharge medications for this patient.      No past medical history on file.    No past surgical history on file.    No family history on file.    Social History     Tobacco Use   • Smoking status: Never Smoker   • Smokeless tobacco: Never Used   Substance Use Topics   • Alcohol use: Never   • Drug use: Never       E-cigarette/Vaping     E-Cigarette/Vaping Substances & Devices       Review of Systems   Constitutional: Negative for chills and fever.   HENT: Negative for ear discharge and rhinorrhea.    Eyes: Negative for visual disturbance.   Respiratory: Negative for shortness of breath.    Cardiovascular: Negative for palpitations and leg swelling.   Gastrointestinal: Negative for vomiting.   Musculoskeletal:  Positive for arthralgias and joint swelling. Negative for gait problem, neck pain and neck stiffness.   Skin: Negative for color change, rash and wound.   Neurological: Positive for headaches. Negative for dizziness, syncope, weakness, light-headedness and numbness.       Physical Exam     ED Triage Vitals [01/28/22 1417]   ED Triage Vitals Group      Temp 98.6 °F (37 °C)      Heart Rate 84      Resp 16      /70      SpO2 99 %      EtCO2 mmHg       Height 5' 3\" (1.6 m)      Weight 182 lb 8.7 oz (82.8 kg)      Weight Scale Used Standing scale      BMI (Calculated) 32.34      IBW/kg (Calculated) 52.4       Physical Exam  Vitals and nursing note reviewed.   Constitutional:       General: She is not in acute distress.     Appearance: Normal appearance. She is well-developed. She is not diaphoretic.   HENT:      Head: Normocephalic and atraumatic. No raccoon eyes or Herrera's sign.      Right Ear: Tympanic membrane, ear canal and external ear normal. No hemotympanum.      Left Ear: Tympanic membrane, ear canal and external ear normal. No hemotympanum.      Nose: Nose normal.      Mouth/Throat:      Mouth: Mucous membranes are moist.      Pharynx: Oropharynx is clear.   Eyes:      General:         Right eye: No discharge.         Left eye: No discharge.      Extraocular Movements: Extraocular movements intact.      Conjunctiva/sclera: Conjunctivae normal.      Pupils: Pupils are equal, round, and reactive to light.   Cardiovascular:      Rate and Rhythm: Normal rate and regular rhythm.      Pulses:           Radial pulses are 2+ on the right side and 2+ on the left side.        Dorsalis pedis pulses are 2+ on the right side and 2+ on the left side.        Posterior tibial pulses are 2+ on the right side and 2+ on the left side.      Heart sounds: Normal heart sounds. No murmur heard.  Pulmonary:      Effort: Pulmonary effort is normal. No respiratory distress.      Breath sounds: Normal breath sounds.   Abdominal:       General: Bowel sounds are normal. There is no distension.      Palpations: Abdomen is soft.      Tenderness: There is no abdominal tenderness.   Musculoskeletal:         General: No deformity.      Cervical back: Normal range of motion and neck supple. No tenderness. No spinous process tenderness or muscular tenderness.      Left hip: No tenderness. Normal range of motion.      Left upper leg: No swelling or tenderness.      Left knee: No swelling or erythema. Normal range of motion. No tenderness.      Left lower leg: No swelling, deformity, tenderness or bony tenderness.      Left ankle: Swelling present. No ecchymosis. Tenderness present. No base of 5th metatarsal or proximal fibula tenderness. Normal range of motion.      Left Achilles Tendon: No tenderness.      Left foot: Normal range of motion and normal capillary refill. No swelling, deformity, tenderness or bony tenderness. Normal pulse.        Feet:       Comments: Mild tenderness just posterior/inferior to bilateral malleolus on the left foot   Lymphadenopathy:      Cervical: No cervical adenopathy.   Skin:     General: Skin is warm and dry.      Capillary Refill: Capillary refill takes less than 2 seconds.   Neurological:      General: No focal deficit present.      Mental Status: She is alert and oriented to person, place, and time. Mental status is at baseline.      GCS: GCS eye subscore is 4. GCS verbal subscore is 5. GCS motor subscore is 6.      Cranial Nerves: No cranial nerve deficit.      Motor: No weakness or abnormal muscle tone.      Coordination: Coordination is intact.   Psychiatric:         Mood and Affect: Mood normal.         ED Course     Procedures    Lab Results     No results found for this visit on 01/28/22.    Radiology Results     Imaging Results          XR Tibia Fibula 2 View Left (Final result)  Result time 01/28/22 15:29:54    Final result                 Impression:    IMPRESSION: No acute osseous abnormality.              Narrative:    EXAM: XR TIBIA FIBULA 2 VW LEFT    CLINICAL HISTORY: left lateral upper leg pain    TECHNIQUE: Two-view left tibia and fibula.    COMPARISON: None.    FINDINGS: There is no fracture, dislocation or other acute osseous  abnormality.                               XR Ankle 3+ View Left (Final result)  Result time 01/28/22 14:39:11    Final result                 Impression:    IMPRESSION:    1.  No acute fracture or dislocation.    2.  Diffuse soft tissue swelling.      //Location Code: [facility]             Narrative:    EXAM:  XR ANKLE 3+ VW LEFT    CLINICAL INDICATION:  left ankle pain sp injury 2 weeks ago     COMPARISON:  None available.    TECHNIQUE:  AP, lateral, and mortise views of the ankle.    FINDINGS:    No acute fracture or dislocation. The ankle mortise is intact. Joint spaces  are maintained. Plantar calcaneal enthesophyte. Mild, diffuse soft tissue  swelling.                                ED Medication Orders (From admission, onward)    Ordered Start     Status Ordering Provider    01/28/22 1421 01/28/22 1422  acetaminophen (TYLENOL) tablet 650 mg  ONCE         Last MAR action: Given RAYRAY MANZANARES               MDM   49-year-old female presenting to the ED with left ankle pain after a fall 10 days ago.  She has been able to walk on it and the pain is actually improved, but she says she is having some swelling still.    Mild tenderness just posterior/inferior to bilateral malleoli on the left foot.  There is some mild ankle swelling.  Range of motion intact. No achilles tenderness and it is intact. Neurovascularly intact.  No bruising or erythema.  No foot tenderness. No fibular head tenderness. Knee ROM intact. Tylenol was given here.  ddx sprain vs fracture vs other  X-ray of the left ankle and tib-fib obtained and reviewed and shows no acute process.  She was put in an ankle stirrup air splint.  Declined crutches.  RICE, tylenol, ibuprofen.  Will follow up with pcp and orthopedics at home  in [facility].    Regarding the head injury, doubt intracranial process as event was 10 days ago, no syncope, no vomiting, no confusion, does not take blood thinners. No headache now. She has no neck tenderness.  Could be tension headaches versus concussion.  Discussed taking Tylenol and ibuprofen, rest, good hydration.    Patient in agreement with plan for discharge.  All questions answered.    Clinical Impression     ED Diagnosis   1. Acute left ankle pain         Disposition        Discharge 1/28/2022  3:50 PM  Erica Vela discharge to home/self care.        Patient seen under the supervision of ALFRED Melendez PA-C  01/28/22 1935     Skin normal color for race, warm, dry and intact. No evidence of rash.

## 2022-03-18 NOTE — DISCHARGE NOTE BEHAVIORAL HEALTH - NSTOBACCONEVERSMOKERY/N_GEN_A
Plan: Discussed treatment options including: tri-hemanth vs microdermabrasion & peel ($100)\\n\\nPatient will call to schedule microdermabrasion when she is ready
Detail Level: Zone
Yes

## 2022-07-06 NOTE — ED STATDOCS - PROGRESS NOTE DETAILS
Monika Carina Cronin: 27 y/o M w/ pmhx of anxiety and depression, presents to ED c/o panic attack. Reports he has been having a panic attack all day. States he was here 3 days ago for panic attack, seen in [facility], given 2mg Klonopin and d/c home. States he garretamabigail takes 2mg Klonopin BID. Reports he has no medication at home, last dose was 3 days ago in ED. Pt has next appointment w/ doctor on 28th. Non-drinker, non-smoker, no recreational drug use. Denies SI or HI. PMD at [facility]. Pt will be sent to Main ED for further evaluation.
Spontaneous, unlabored and symmetrical

## 2022-10-27 NOTE — ED BEHAVIORAL HEALTH ASSESSMENT NOTE - NS ED BHA HOMICIDALITY PRESENT CURRENT PLAN
Blood pressure is stable at 120 2/80 mm Hg continue on Hytrin 5 mg at bedtime, continue on hydrochlorothiazide 12.5 mg daily.  Maintain on low-salt diet   None known

## 2022-12-14 NOTE — ED BEHAVIORAL HEALTH ASSESSMENT NOTE - NS ED BHA REVIEW OF ED CHART AVAILABLE INVESTIGATIONS REVIEWED
Patient requesting orders be sent to [facility]. He states that he has used them in the past.    None available

## 2023-01-26 NOTE — ED BEHAVIORAL HEALTH ASSESSMENT NOTE - NS ED BHA HOMICIDALITY PRESENT AGGRESSION PROPERTY LIFETIME
Elevate Right Leg as much as possible for control of swelling.  Keep your legs up while lying or sitting. Follow a low-salt diet to stop extra fluid from staying in your body. Follow a nutritious diet (increase protein, Vitamin A & C, & Zinc) and maintain normal blood sugar levels for optimal wound healing.    Monitor for signs of infection and impaired circulation.    
None known

## 2023-02-23 NOTE — ED PROVIDER NOTE - CPE EDP [facility] NORM
Implemented All Universal Safety Interventions:  [place] to call system. Call bell, personal items and telephone within reach. Instruct patient to call for assistance. Room bathroom lighting operational. Non-slip footwear when patient is off stretcher. Physically safe environment: no spills, clutter or unnecessary equipment. Stretcher in lowest position, wheels locked, appropriate side rails in place. normal...

## 2023-03-02 NOTE — ED PROVIDER NOTE - PLAN OF CARE
1) Please follow-up with Psychiatry as an outpatient  2) If you have any worsening of symptoms please return to the ED immediately. 1) Please follow-up with Psychiatry as an outpatient  2) If you have any worsening of symptoms please return to the ED immediately.  3) Please take KLONOPINE at bed as directed. Cephalexin Pregnancy And Lactation Text: This medication is Pregnancy Category B and considered safe during pregnancy.  It is also excreted in breast milk but can be used safely for shorter doses.

## 2023-06-15 NOTE — ED PROVIDER NOTE - NS ED MD DISPO DISCHARGE CCDA
Quality 226: Preventive Care And Screening: Tobacco Use: Screening And Cessation Intervention: Patient screened for tobacco use and is an ex/non-smoker
Detail Level: Detailed
Quality 110: Preventive Care And Screening: Influenza Immunization: Influenza Immunization Administered during Influenza season
Quality 137: Melanoma: Continuity Of Care - Recall System: Patient information entered into a recall system that includes: target date for the next exam specified AND a process to follow up with patients regarding missed or unscheduled appointments
Quality 111:Pneumonia Vaccination Status For Older Adults: Patient received any pneumococcal conjugate or polysaccharide vaccine on or after their 60th birthday and before the end of the measurement period
Patient/Caregiver provided printed discharge information.

## 2023-09-22 NOTE — ED STATDOCS - PROGRESS NOTE
Patient  c/o left flank and back pain. Rates 10/10. Unrelieved by PRN Dilaudid 1mgx2. On call provider Sue HANSEN notified.  UA/c&s and Morphine 4mg IV x1 ordered. See Mar. VSS. Will continue to monitor.   Stable.

## 2023-09-26 NOTE — DISCHARGE NOTE BEHAVIORAL HEALTH - MEDICATION SUMMARY - MEDICATIONS TO CHANGE
Called pt and schedule appt 10/2/23.      Pt would like transportation assistance for her appt on 10/2/23 and appt on 10/24/23 at     [address] [facility] [address]     Can  assist with this? Please let me know and will call pt to inform her.      Yoel Baird, BSN RN  [facility]     I will SWITCH the dose or number of times a day I take the medications listed below when I get home from the hospital:  None
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
## 2023-11-24 NOTE — PATIENT PROFILE ADULT. - PRO INTERPRETER NEED 2
PAST SURGICAL HISTORY:  H/O knee surgery     S/P TAVR (transcatheter aortic valve replacement)     
English

## 2024-02-01 NOTE — ED ADULT TRIAGE NOTE - BMI (KG/M2)
[facility] EMERGENCY DEPT  EMERGENCY DEPARTMENT HISTORY AND PHYSICAL EXAM      Date: 2/1/2024  Patient Name: Renae De Leon  MRN: [id number]  Birthdate 1959  Date of evaluation: 2/1/2024  Provider: Liban Lara DO   Note Started: 4:22 PM EST 2/1/24    HISTORY OF PRESENT ILLNESS     Chief Complaint   Patient presents with    Leg Pain     History Provided By: Patient    HPI: Renae De Leon is a 64 y.o. female with past medical history of diverticulosis, sciatica, gastroparesis, and hypertension  who presents with right low back pain radiating down her right leg.  Pain has been worsening slowly over time.  She has had pain issues for about 6 years.  States she has numbness and pain in the right leg that is worsening.  Denies fevers, history of IV drug use, cancer history, bilateral leg weakness, or bowel or bladder problems.  No recent falls.    PAST MEDICAL HISTORY   Past Medical History:  Past Medical History:   Diagnosis Date    Diverticulosis     Gastroparesis     Hypertension        Past Surgical History:  Past Surgical History:   Procedure Laterality Date    CHOLECYSTECTOMY      COLONOSCOPY N/A 8/15/2023    COLONOSCOPY performed by Babs Barrera MD at [facility] ENDOSCOPY    COLONOSCOPY N/A 8/15/2023    COLONOSCOPY POLYPECTOMY SNARE/COLD BIOPSY performed by aBbs Barrera MD at [facility] ENDOSCOPY    HX CYSTECTOMY      back and neck    HYSTERECTOMY (CERVIX STATUS UNKNOWN)      LAPAROTOMY      SHOULDER ARTHROPLASTY Bilateral     UPPER GASTROINTESTINAL ENDOSCOPY N/A 8/15/2023    EGD ESOPHAGOGASTRODUODENOSCOPY AND COLONOSCOPY performed by Babs Barrera MD at [facility] ENDOSCOPY    UPPER GASTROINTESTINAL ENDOSCOPY N/A 8/15/2023    EGD BIOPSY performed by Babs Barrera MD at [facility] ENDOSCOPY    UPPER GASTROINTESTINAL ENDOSCOPY N/A 1/16/2024    EGD SUBMUCOSAL/BOTOX INJECTION performed by Babs Barrera MD at [facility] ENDOSCOPY       Family History:  History reviewed. No pertinent family history.    Social History:  Social History     Tobacco Use    
25.1

## 2024-02-17 NOTE — ED PROVIDER NOTE - CHIEF COMPLAINT
Discharge Summary    CHIEF COMPLAINT ON ADMISSION  Chief Complaint   Patient presents with    Chest Pain     'Sharp / twisting' pain 2 days ago lasting for 4 hours, L chest, associated exhaustion during and afterwards, denies cardiac hx, pt states he has not had chest pain since then, seen in urgent care today and sent here for further workup         Reason for Admission  Chest pain     Admission Date  2/15/2024    CODE STATUS  Prior    HPI & HOSPITAL COURSE    Gaetano Boles is a 36 y.o. male with no known past medical history, but does not follow doctors regularly for routine medical care, who presented 2/15/2024 with sharp chest pain in the last 2 days.  He also had an episode of generalized weakness, dizziness and nausea with 1 episode of nonbloody nonbilious vomiting.      On evaluation, blood pressure was significantly elevated in the 190s.  EKG showed LVH with ST depressions in the inferior leads. Echocardiogram showed severely reduced left ventricular systolic function, EF of 25%, with moderately dilated left ventricle, moderately dilated right ventricle, reduced right ventricular systolic function with preserved and severely dilated greater than 48 mL/m², mild aortic insufficiency, and mild pulmonary hypertension. Stress without evidence of reversible ischemia.     Patient was started on medical management for his congestive heart failure.  [facility]  They were delivered to his bedside prior to discharge.  He is going to follow-up with primary care this week, he needs to establish a new primary care.  Additionally, he will need to follow-up with cardiology in 1 to 2 weeks.      Prior to discharge I had a long conversation with patient and his parents at bedside regarding this new diagnosis.  This has been an incredibly challenging few days.  Patient is very fearful for his overall health.  He is looking forward to his upcoming appointments.    Therefore, he  The patient is a 27y Male complaining of is discharged in good and stable condition to home with close outpatient follow-up.    The patient met 2-midnight criteria for an inpatient stay at the time of discharge.    Discharge Date  2/17/2024    FOLLOW UP ITEMS POST DISCHARGE  Established with new primary care 1 week  Follow-up with cardiology 1 week    DISCHARGE DIAGNOSES  Principal Problem:    New HFrEF (heart failure with reduced ejection fraction) (HCC) (POA: Yes)  Active Problems:    Hypertensive urgency (POA: Yes)    Prediabetes (POA: Yes)    Incidental lung nodule, > 3mm and < 8mm (POA: Yes)    HFrEF (heart failure with reduced ejection fraction) (HCC) (POA: Unknown)  Resolved Problems:    Elevated troponin (POA: Yes)      FOLLOW UP  No future appointments.  [address] [address]  [phone]  Follow up in 1 week(s)      [facility]  [address], [address]  [facility] [id number] [phone]  Follow up in 1 week(s)        MEDICATIONS ON DISCHARGE     Medication List        START taking these medications        Instructions   carvedilol 25 MG Tabs  Commonly known as: Coreg   Take 1 Tablet by mouth 2 times a day with meals.  Dose: 25 mg     Farxiga 10 MG Tabs  Generic drug: dapagliflozin propanediol   Take 1 Tablet by mouth every day.  Dose: 10 mg     losartan 25 MG Tabs  Start taking on: February 18, 2024  Commonly known as: Cozaar   Take 1 Tablet by mouth every day.  Dose: 25 mg     spironolactone 25 MG Tabs  Start taking on: February 18, 2024  Commonly known as: Aldactone   Take 1 Tablet by mouth every day.  Dose: 25 mg            STOP taking these medications      aspirin 325 MG Tabs  Commonly known as: Asa              Allergies  No Known Allergies    DIET  No orders of the defined types were placed in this encounter.      ACTIVITY  As tolerated.  Weight bearing as tolerated    CONSULTATIONS  cardiology    PROCEDURES  None     LABORATORY  Lab Results   Component Value Date    SODIUM  136 02/17/2024    POTASSIUM 3.9 02/17/2024    CHLORIDE 102 02/17/2024    CO2 18 (L) 02/17/2024    GLUCOSE 93 02/17/2024    BUN 18 02/17/2024    CREATININE 1.45 (H) 02/17/2024        Lab Results   Component Value Date    WBC 9.6 02/17/2024    HEMOGLOBIN 17.0 02/17/2024    HEMATOCRIT 51.8 02/17/2024    PLATELETCT 286 02/17/2024        Total time of the discharge process exceeds 42 minutes.

## 2024-06-18 NOTE — ED ADULT NURSE NOTE - IS THE PATIENT ABLE TO BE SCREENED?
Chief Complaint   Patient presents with    Follow-up     1 week       Patient is a 68yo female with past medical history significant for hypertension and uterine cancer who presents to clinic for follow up of new onset a-fib.       Last visit on 6/11/24, patient was re-prescribed her atenolol which she ran out of for her HTN. CMP and TSH were WNL. Patient was complaining of worsening SOB at that time. Her exam was significant for irregular heart beat confirmed by EKG which is suggestive of a-fib. CHADSVASc Stroke Risk Score = 3, but patient reluctant to take any additional prescription medications such as eliquis/warfarin, so she agreed to be prescribed aspirin 81mg qd while awaiting rest of afib work up.     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 EKG remains in a-fib with HR in the 100s despite taking her atenolol 25mg qd. She has not yet scheduled her TTE.       Review of Systems   Constitutional:  Negative for chills and fever.   HENT:  Negative for congestion.    Eyes:  Negative for visual disturbance.   Respiratory:  Positive for chest tightness and shortness of breath.    Cardiovascular:  Positive for palpitations. Negative for chest pain and leg swelling.   Neurological:  Negative for dizziness, syncope, light-headedness and headaches.     ALLERGIES:   Allergen Reactions    Dust Runny Nose    Molds & Smuts Runny Nose     Current Outpatient Medications   Medication Sig Dispense Refill    atenolol (TENORMIN) 50 MG tablet Take 1 tablet by mouth daily. 30 tablet 1    apixaBAN (ELIQUIS) 5 MG Tab Take 1 tablet by mouth every 12 hours. 60 tablet 1    pantoprazole (PROTONIX) 40 MG tablet Take 1 tablet by mouth in the morning and 1 tablet in the evening. 60 tablet 0    fexofenadine (ALLEGRA) 180 MG tablet Take 180 mg by mouth daily.      Calcium Carbonate Antacid (CALCIUM CARBONATE PO) Take 500 mg by mouth daily.      Multiple  Vitamins-Minerals (MULTIVITAL PO)        No current facility-administered medications for this visit.     Immunization History   Administered Date(s) Administered    COVID Pfizer 12Y+ 07/24/2022    COVID Pfizer 12Y+ (Requires Dilution) 01/06/2021, 01/27/2021, 10/20/2021    COVID Pfizer Bivalent 12Y+ 10/04/2022    COVID Pfizer/Comirnaty 12+ ([id number]) 11/26/2023    Influenza, quadrivalent, adjuvanted 11/26/2023    Influenza, quadrivalent, multi-dose 10/04/2022    Influenza, quadrivalent, preserve-free 10/15/2019    Pneumococcal conjugate PCV20 10/18/2022    Tdap 10/18/2022     Patient Active Problem List   Diagnosis    Primary hypertension    Cigarette smoker    Acute cystitis with hematuria     Past Medical History:   Diagnosis Date    Allergy     Arthritis     HTN (hypertension)     Uterus cancer  (CMD)      Past Surgical History:   Procedure Laterality Date    Appendectomy      Cholecystectomy      Hernia mesh removal      Hernia repair      Hysterectomy        Family History   Problem Relation Age of Onset    Myocardial Infarction Mother     Myocardial Infarction Father     Asthma Sister     Diabetes Sister     Diabetes Sister     Patient is unaware of any medical problems Sister     Diabetes Brother     Diabetes Brother      Social History     Socioeconomic History    Marital status:      Spouse name: Not on file    Number of children: Not on file    Years of education: Not on file    Highest education level: Not on file   Occupational History    Not on file   Tobacco Use    Smoking status: Every Day     Current packs/day: 0.50     Average packs/day: 0.5 packs/day for 54.5 years (27.2 ttl pk-yrs)     Types: Cigarettes     Start date: 1970    Smokeless tobacco: Current   Vaping Use    Vaping status: never used   Substance and Sexual Activity    Alcohol use: Yes     Alcohol/week: 1.0 standard drink of alcohol     Types: 1 Cans of beer per week     Comment: per a week    Drug use: Never    Sexual activity:  Not Currently   Other Topics Concern    Not on file   Social History Narrative    Not on file     Social Determinants of Health     Financial Resource Strain: Not on file   Food Insecurity: Not on file   Transportation Needs: Not on file   Physical Activity: Not on file   Stress: Not on file   Social Connections: Not on file   Interpersonal Safety: Not on file     Visit Vitals  /75 (BP Location: RUE - Right upper extremity, Patient Position: Sitting)   Pulse 90   Temp 96.2 °F (35.7 °C) (Tympanic)   Wt 66.7 kg (147 lb)   SpO2 95%   BMI 27.78 kg/m²     Physical Exam  Vitals reviewed.   Constitutional:       General: She is not in acute distress.     Appearance: Normal appearance. She is not ill-appearing or diaphoretic.   HENT:      Head: Normocephalic and atraumatic.      Nose: Nose normal.      Mouth/Throat:      Pharynx: Oropharynx is clear.      Neck: Normal range of motion.   Eyes:      Extraocular Movements: Extraocular movements intact.   Cardiovascular:      Rate and Rhythm: Normal rate. Rhythm irregular.      Pulses: Normal pulses.      Heart sounds: Normal heart sounds.   Pulmonary:      Effort: Pulmonary effort is normal. No respiratory distress.      Breath sounds: Normal breath sounds. No wheezing or rales.   Abdominal:      Palpations: Abdomen is soft.   Musculoskeletal:         General: Normal range of motion.      Right lower leg: No edema.      Left lower leg: No edema.   Skin:     General: Skin is warm and dry.   Neurological:      General: No focal deficit present.      Mental Status: She is alert and oriented to person, place, and time. Mental status is at baseline.      Cranial Nerves: No cranial nerve deficit.   Psychiatric:         Mood and Affect: Mood normal.         Behavior: Behavior normal.         Thought Content: Thought content normal.         Judgment: Judgment normal.       Assessment/Plan:  Patient is a 68yo female with past medical history significant for hypertension and  uterine cancer who presents to clinic for follow up of new onset a-fib.     #New Onset A-fib  Plan:  -With CHADSVASc Stroke Risk Score = 3, patient now agreeable to start eliquis 5mg BID and discontinue ASA 81mg.   -Increasing atenolol to 50mg qd  -Reminded patient to schedule TTE as soon as possible  -Return to clinic for follow up on rate control in 1 week    Discussed with Dr. Biswas. Please see attending addendum for final recommendations.     Nadeem Jean MD  Internal Medicine PGY-2  Available via [employer]  6/18/2024  1:54 PM        Dr. Nadeem Jean (Resident) performed a history and physical exam of the patient and we discussed the management plan.  I reviewed the Resident’s note and agree with the documented findings and plan of care.      Bobby Biswas MD    Yes

## 2024-07-30 NOTE — ED PROVIDER NOTE - DATE/TIME 1
Ernie Iraheta is a 68 y.o. male who presents for Prolozone #1 injection into his RIGHT knee. His pain today is 4/10.  He has been doing his home exercise program. He has not yet scheduled with physical therapy. I told him it is very important that he is scheduled for his physical therapy.  We also talked about transitioning back to activity and exercises that what he can do and what I recommended that he does for modification.  He is looking forward to starting the regenerative injections and joint lubrication injections.    All previous Progress Notes and imaging results related to this patients chief complaint have been reviewed in preparation for this examination.    Past Medical History  He has a past medical history of Anterolisthesis of lumbar spine (09/03/2023), Anxiety disorder (06/27/2012), Cervical radiculopathy (10/25/2023), Cervical spondylosis (10/25/2023), Chronic pain disorder (Neck, low back, right knee), Class 1 obesity (09/03/2023), DDD (degenerative disc disease), lumbar (09/03/2023), Degeneration of intervertebral disc of cervical region (10/25/2023), Discitis of lumbosacral region (09/03/2023), Hyperlipidemia, Hypertension, Knee pain, Low back pain, Lumbar spondylosis (09/03/2023), Lumbosacral radiculopathy due to degenerative joint disease of spine (09/03/2023), Neck pain (May 2023), Posterior vitreous detachment of right eye (09/03/2023), Prediabetes (09/03/2023), Prolapsed cervical intervertebral disc (10/25/2023), Prolapsed internal hemorrhoids, grade 3 (09/03/2023), Shingles (2019), Spondylolisthesis of lumbosacral region (09/03/2023), and Unspecified disorder of refraction (07/02/2024).    Surgical History  He has a past surgical history that includes  reports that he has never smoked. He has been exposed to tobacco smoke. He has never used smokeless tobacco. He reports current alcohol use of about 4.0 - 6.0 standard drinks of alcohol per week. He reports that he does not use drugs.    Family History  Family History   Problem Relation Name Age of Onset    Stroke Mother Tania     Heart disease Mother Tania     Hypertension Mother Tania     Liver cancer Father Pb Iraheta     Cancer Father Pb Iraheta     Alcohol abuse Father Pb Iraheta     No Known Problems Sister      Heart disease Brother Abraham     Hypertension Brother Abraham     No Known Problems Son        Allergies  Shellfish containing products, Iodine, and Penicillins    Historical Clinical Intake:  July 16, 2021--------------------------CL  Patient is a 64 y/o male presenting today with bilateral knee pain for the last several years. He was seen here yesterday for chronic low back pain and was referred for another appointment this morning. He reports that he has had pain in his left knee for the last two years or so, citing prior visits to Dr. Pink for cortisone injections. He reports that he had some torn meniscus cleaned up surgically in the left knee. Recently, he notes that the same sort of pain has started up in the right knee. He states that it has been progressively getting worse and he cannot do his normal walking and hiking that he usually does. 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 He reports today that his pain is a throbbing 5/10, but at its worst is a 7-8/10. He notes some previous clicking in the right knee that has since subsided and denies any numbness, tingling, or pins  "Ernie is here for a reevaluation of his bilateral knee pain and to review his RIGHT knee and lumbar spine MRI. He is currently in physical therapy and performs home exercises provided to him by PT, noting although his right knee pain persists his low back pain has slightly improved. He describes his knee pain as a dull ache to the medial and posterior aspect of this right knee. He denies any numbness/tingling or radiculopathy at this time. Ernie notes his lumbar spine pain as been ongoing for a number of years which he believes he her whenever he was younger but never told his parents. He rates his pain at 4/10 today and is not using pain management therapies at this time. He is hopeful to be able to hike with his son during a planned vacation in October. Reviewed his right knee MRI in detail. Discussed regenerative injections versus corticosteroid injections versus viscosupplementation injections versus combination approach. Reviewed his lumbar spine MRI in detail. Discussed possibility in the future of referral to Physical Medicine and rehab for pain management for epidural injections. Also discussed regenerative injections such as stem cell injections in the future.  ----------------------------------------------------  September 22, 2021-----above notes reviewed. Ernie is here for #1 Prolozone injection into his RIGHT knee. He states that is right knee has been somewhat more sore recently. He denies any c/o swelling or instability. Positive c/o clicking and grinding in his right knee with pain primarily along the medial aspect of his knee. He has been attending PT with K[facility] at [facility] and is scheduled for aquatic therapy this week. He states that PT has made a huge difference in " "September 28,2021 Above notes reviewed. Ernie is here for Euflexxa #1+ injection into his RIGHT knee. He is continuing to perform his exercises as home as directed previously by physical therapy. Right knee pain persists, although patient says pain has diminished tremendously. Sore at times after walking. Patient rode the recumbant bike this past Friday and when he got off the knee felt \"frozen\" on the medial aspect. Patient limped afterward for about an hour. Next morning he was able to walk and it felt fine. rEnie denies any instability, locking or catching. Patient was given a brace from here, but he hasn't felt the need to wear it yet. He will be visiting his son far away in approx a month and feels it will be better suited to wear at that time. Patient has not gotten the heel lift or insoles yet. Pain level today is 3/10.  ----------------------------------------------------  October 5, 2021 Verbal consent of the patient and/or verbal parental consent for patients under the age of 18 have been obtained to conduct a physical examination at this office visit. All notes reviewed. Ernie is here for Prolozone #2 injection into his RIGHT knee. He continues to perform his home exercise program provided to him by physical therapy. Ernie reports marked improvement since beginning injection therapy. He rates his pain at 2/10 today. He has continued to be amazed at the results he is getting with the injections.  ----------------------------------------------------  October 7, 2021 month with his son. Ernie rates his pain at 3/10 today. He is able to perform home exercises provided to him by physical therapy without complications. He exercises in the gym routinely and says after walking briskly on the ellipitical for approximately 30 minutes this morning he experienced a generalized soreness throughout his right knee joint.  ----------------------------------------------------  December 8, 2021 All notes reviewed. continues to perform his home exercise program as directed by physical therapy. Ernie reports performing more aggressive exercises with complaints of a mild ache to the medial aspect of his right knee. He rates his pain at 1/10 today.  ----------------------------------------------------  06/26/24 Ernie Iraheta is a 68 y.o. male who presents for an re-evaluation of their Right knee. Pain x approximately 1 month after hiking in [facility] and in [place] where he was walking on a lot of [place], uneven surfaces and inclines/declines. He notes pain is 4/10 and is achy in nature; however, a couple of weeks ago his pain was sharp with activity. He does have mild c/o instability and swelling in his RIGHT knee. He has been taking OTC NSAIDS and Tylenol x 6 weeks with no change in symptoms. The pain is worse when he is walking for long periods of time or going down stairs. Additionally, he has completed and failed 6 weeks of PT directed home exercises. He typically wears hiking boots with good support as  addition to taking OTC supplementation to reduce inflammation. He has re-aggravated his RIGHT knee injury and continues to have pain and instability despite 6 weeks of physical therapy and NSAIDs and therefore an MRI was ordered to evaluate for ACL, MCL and meniscus injury.   ----------------------------------------------------  07/18/24 Ernie Iraheta is a 68 y.o. male who presents to review his RIGHT knee MRI. He continues to perform his home strengthening and stretching exercises previously learned in Physical Therapy, however he cites the desire to complete PT on his cervical spine before starting back into PT for his knee. He notes pain and functionality improved with his first Euflexxa injection series in 2021, however he reports pain and been reaggravated in recent months. He describes his pain as a burning ache along the medial aspect of his right patella that is occasionally felt posteriorly. He denies any locking or catching at this time. He rates his pain at 4/10 today. He takes OTC Naproxen and applies an OTC topical muscle care cream for pain management with mild, temporary relief. He wears supportive [employer] footwear and takes recommended OTC Turmeric, but admits to still needing to purchase full foot insoles and OTC joint health supplement Move Free. Reviewed right knee MRI in detail with the patient; recommendation to seek insurance approval for viscosupplementation injections to alternate with regenerative Prolozone injections versus a joint aspiration with reinjection of a corticosteroid versus regenerative injections including Prolozone/PRP/Prolozone. Additionally stressed the importance of starting back into Physical Therapy and performing his home  exercise and nutrition.        Review of Systems  CONSTITUTIONAL:   Negative for weight change, loss of appetite, fatigue, weakness, fever, chills, night sweats, headaches .           HEENT:   Negative for cold, cough, sore throat, sinus pain, swollen lymph nodes.           OPHTHALMOLOGY:   Negative for diminished vision, blurred vision, loss of vision, double vision.           ALLERGY:   Negative for runny nose, scratchy throat, sinus congestion, rash, facial pressure, nasal congestion, post-nasal drip.           CARDIOLOGY:   Negative for chest pain, palpitations, murmurs, irregular heart beat, shortness of breath, leg edema, dyspnea on exertion, fatigue, dizziness.           RESPIRATORY:   Negative for chest pain, shortness of breath, swelling of the legs, asthma/copd, chest congestion, pain with breathing .           GASTROENTEROLOGY:   Negative for nausea, vomitting, heartburn, constipation, diarrhea, blood in stool, change in bowel habits, black stool.           HEMATOLOGY/LYMPH:   Negative for fatigue, loss of appetitie, easy bruising, easy bleeding, anemia, abnormal bleeding, slow healing.           ENDOCRINOLOGY:   Negative for polyuria, polydipsia, polyphagia, fatigue, weight loss, weight gain, cold intolerance, heat intolerance, diabetes.           MUSCULOSKELETAL:   Positive  for RIGHT  knee joint pain        DERMATOLOGY:   Negative for rash, bruising.           NEUROLOGY:   Negative for tingling, numbness, gait abnormality, paresthesias, weakness, sciatica.          Examination:   All findings slight improvement since last visit  RIGHT  Knee   Edema: Negative  Effusion: Negative.   Percussion Test:  Negative  Tuning Fork Test: Negative.    Orientation: Negative.       Range of Motion:   All findings slight improvement since last visit  Positive Knee Flexion ( 0-135 degrees) Decreased because of pain and swelling  Positive Knee Extension ( 0-15 degrees) Decreased because of pain and swelling           Muscle Strength: All findings slight improvement since last visit  Positive +4-+5/+5 Quadricep Extension Causes pain  Positive +4-+5/+5 Hamstring Flexion Causes pain  +5+5 Gastrocnemius  +5+5 Soleus.            Proprioception:  All findings slight improvement since last visit  Pain with Squat: Positive    Pain with Sumo Squat:  Positive   Hop Test: Positive    Single leg balance test Positive            Vascular:   +2/+4 Femoral  +2/+4 Dorsalis Pedis  +2/+4 Posterior Tibial  Capillary Refill less than 2 seconds.                Knee - ACL: All findings slight improvement since last visit  Anterior Drawer Test: Positive very slightly positive with endpoint felt  Lachman Test: Positive  slightly but endpoint felt  Lelli Test: Positive                    KNEE - MCL / LCL: All findings slight improvement since last visit  Valgus Stress Test: 90 degrees: Positive  20-30 degrees: Positive    Varus Stress Test:  90 degrees: Negative, 20-30 degrees: Negative.   Apley Distraction Test: Positive  Medial.   Thessaly Test: Positive  Origin and Insertion MCL, Anterior Medial Meniscus, Posterior Medial Meniscus, Distal Hamstring.            KNEE - MENISCUS: All findings slight improvement since last visit  Apley Compression Test:  Positive Medial joint line.          Stienmann Test:  Positive   Dory Test:  Positive Medial joint line.         Bounce Home Test:  Positive   Thessaly Test:   Glide Test:  Positive   Grind Test: Positive   Patella Tracking Test: Positive              KNEE - QUADRICEPS: All findings slight improvement since last visit  VMO Test: Positive   VLO Test:  Negative    Hip/Pelvis - Sacrum:  Leg Length Supine: Positive LEFT leg shorter than the Right  and Verified with standing erect pelvis xray 5.6mm  Leg Length Supine to Seated (Derbolowsky Sign): Positive LEFT leg shorter than the Right  and Verified with standing erect pelvis xray  Standing Flexion Test: Positive  Right  Seated Flexion Test: Positive  Right  Spring Test: Positive     Sacral Somatic Dysfunction: Positive LrLa: LEFT rotation LEFT axis  Hip Flexor Tightness: Positive   Hamstring Tightness: Positive           Feet/Foot: Positive BILATERAL Valgus foot        Imaging and Diagnostics Review:      IMPRESSION RIGHT KNEE MRI July 11 2024:  1. Free edge tear involving portions of the body and posterior horn of the medial meniscus about the body junction. No longitudinal tear.  2. Medial femorotibial osteoarthritis with focal area of cartilage defect central femoral condyle and mild thinning within the weight-bearing portion of the compartment. Subchondral reactive edema in the femoral condyle.    3. Focal grade 3 chondromalacia posterior lateral tibial plateau.  4. Septated appearing fluid collection intimate with the semimembranosus tendon complex without significant reactive edema suggesting sequela of semimembranosus bursitis.  5. MCL sprain  6. Loose bodies, medial knee joint and posteriorly  7. Lateral meniscus degeneration  8. Distal quadriceps tendinosis  9. ACL degeneration      Signed by: Enrrique Levy 7/11/2024 10:15 AM  Dictation workstation:    [id number]  ---------------------------------------------------------      IMPRESSION XR KNEE RIGHT 6/27/2024:  Normal radiographs of the right knee        Signed by: Rommel Rosario 6/27/2024 6:25 PM  Dictation workstation:   [id number]  ---------------------------------------------------------  NEW standing erect pelvis x-ray done on June 26, 2024 showed the following:  Left leg shorter than right leg by the following:  Iliac crest:  11.9 mm  Sacral base:  4.0 mm  Midline femoral heads:  7.6 mm  Median difference of the left leg being shorter than right leg is approximately:  7.83 mm     IMPRESSION XR PELVIS 6/27/2024:  Mild degenerative change of the hips.      Signed by: Rommel Rosario 6/27/2024 6:22 PM  Dictation workstation:   [id number]  ---------------------------------------------------------    IMPRESSION KNEE RT MRI Jul 28 2021:  1. There is a 6 x 7 mm cartilage defect within the anterior margin medial femoral condyle with adjacent 5 mm nondisplaced chondral flap. Minimal subchondral reactive edema demonstrated.  2. Subtle free edge tear of the posterior horn the medial meniscus about the body junction.  3. There is a 3 mm intra-articular body posterior knee joint recess centrally.     Original Electronically Signed by/Date: LOR BRAVO MD Jul 28 2021 11:30A  ---------------------------------------------------------    IMPRESSION BILATERAL KNEE XR  Jul 16 2021:  Unremarkable exam.     Original Electronically Signed by/Date: JANIA HOFFMAN MD Jul 16 2021  4:55P        Assessment   1. Chronic pain of right knee        2. Primary osteoarthritis of right knee        3. Chondromalacia of right patella        4. Sprain of medial collateral ligament of right knee, subsequent encounter        5. Tear of medial meniscus of right knee, unspecified tear type, unspecified whether old or current tear, subsequent encounter        6. Meniscus degeneration, right        7. Acute lateral meniscal injury of the  knee, right, subsequent encounter        8. Semimembranosus bursitis of right knee        9. Chondral defect of right patella        10. Grade 1 injury of medial collateral ligament of right knee, subsequent encounter        11. Injury of anterior cruciate ligament, right, subsequent encounter        12. Synovial cyst of popliteal space (Alanis), right knee        13. Loose body of right knee        14. Instability of both knee joints        15. Tendinosis of quadriceps tendon        16. Leg length discrepancy        17. Valgus deformity of both feet        18. Bilateral lower extremity edema        19. Venous stasis            Procedure  Time Out (5 Minutes): Yes  Patient Name: [unfilled]  YOB: 1956  Procedure: Prolozone injection #1  Needed Supplies Available: Correct Supplies  Laterality: RIGHT knee  Site Verified and Marked: Correct Site(s) Marked  Timeout Performed by Provider: Dr. Kane Nieves, D.O.  Staff Initials: ALZ    Patient is informed and consent has been signed by the patient if over 18 years old.   Patient parent and/or legal guardian is informed and consent has been signed.   Patient and/or parent and/or legal guardian accept all risks, benefits, and possible complications associated with procedure(s) and/or manipulation(s) and/or injection(s).  Patient and/or parent and/or legal guardian deny patient allergy or known complications with any of the medications, instruments, products, and/or techniques used.  All questions and concerns were answered and/or addressed with the patient and/or parent and/or legal guardian.  The patient and/or parent and/or legal guardian agree to proceed with the procedure(s) and/or manipulation(s) and/or injection(s).  Prep: The area was cleansed with a mixture of equal parts rubbing alcohol 70% and Hibclens.  Utilizing clean technique, the injection site(s) were marked with a skin marker.   Injection site(s) were anesthestized with topical analgesic  1mL Sodium Bicarbonate 8.4% (NDC: 6359-5424-42  LOT#: [id number]  EXP: 11/30/25)  1mL Vitamin B12 (NDC: 69022-760-38; LOT#: [id number] EXP: 08/31/24)   0.1mL Folic Acid (NDC: 89308-442-45; LOT#: [id number] EXP: 02/28/25)  0.1mL Vitamin B Complex (NDC: 53544-394-59; LOT#: [id number] EXP: 06/30/25)  0.6mL 50% Dextrose (NDC: [address]-3; LOT#: [id number] EXP: 11/01/24)  0.1mL Traumeel (NO NDC), 2mL Biocean Marine Plasma (NO NDC)   1.7mL Biocean Marine Plasma (NO NDC)  Followed by injection of Ozone 20mg/ml without removing the needle    Band-aid and/or compressive bandage was placed over the injection site(s). After the injection(s) performed osteopathic manipulation therapy to the affected area(s) to help increase blood flow to aid with the healing process as well as circulation of the medication. The patient tolerated the procedure well without any complications. The patient was instructed to gently massage the treatment site(s) as well as to apply moist heat to the affected area(s). Additionally, the patient was instructed to contact the office immediately with any complications or concerns.    The Nurse, Shilpi, was present in the room during the entire procedure.        The following discharge instructions were reviewed in detail with the patient to the level of their understanding:    PAIN:  A mild to moderate amount of discomfort, tenderness, stiffness, and achiness-caused by the inflammation of the area can be expected for a few days after the injection. This is normal and good as the inflammation is an important part of the healing process.    SKIN: Due to the numbing spray used, you may develop a red, itchy, scaly rash in the area that was sprayed. Should your skin become  itchy, wash the area with mild soap and warm water. If needed, you may apply topical over-the-counter Hydrocortisone cream to alleviate any itchiness. AVOID scratching due to risk of infection.,     BATHING/SWIMMING: You may shower after your procedure with regular soap and water, but no baths, no swimming, and no hot tubs for 3-4 days after the procedure.,     ACTIVITIES:  Immediately following the injection, you may resume daily activities (such as work) and light exercise. We suggest that you refrain from strenuous activity and heavy lifting, particularly the injured body part, for a week post-procedure, but sometimes this can change as well.    MOIST HEAT/ICE:  Moist heat may be used on the injection area. NO ICE.  MEDICATION: You may continue your prescribed medications and any over-the-counter supplements; however, it is not recommended to use aspirin or anti-inflammatories (Motrin, Advil, Aleve, Ibuprofen, Naproxen etc.) for up to 2 weeks post procedure. Tylenol Extra Strength, Tylenol Arthritis and/or any prescribed narcotics or muscle relaxants are OK to take.    APPOINTMENTS: You should have a follow-up appointment with Dr. Nieves scheduled 1-3 weeks as recommended after your procedure for your next round of injections or to follow-up after you have completed your injection series. Please schedule your follow-up appointment on your way out after your procedure, or call the office to schedule these appointments as soon as possible.    PRECAUTIONS: Smoking, caffeine, alcohol, sex and anti-inflammatories post-procedure may reduce the effectiveness of Prolotherapy/Neural Prolotherapy/Prolozone injections. Early, rare post procedure problems that can be concerning are as follows: an increase in pain not relieved by medication (over the counter or prescription), a temperature above 101 degrees, bleeding or progressive, extreme swelling, or numbness.     CALL THE OFFICE OR GO TO THE EMERGENCY ROOM IF ANY OF THESE  of the knees, patellofemoral tracking syndrome,  meniscus injury, and/or patellar tendinitis in detail with the patient to the level of their understanding; a copy of this handout was provided to the patient at the time of this office visit.    Stressed the importance of starting into Physical Therapy 1-2 times a week for 8-10 weeks with manual therapy as well as dry needling and IASTM  Stressed the importance of continuing to perform home strengthening and stretching exercises previously learned in Physical Therapy  Reviewed VMO strengthening exercises to be performed routinely to aid with patellar maltracking as well as side to side and forward to backward steps with/without bands to help strengthen his RLE  Stressed the importance of wearing shoes with good stability control to help with the biomechanics affecting the knees and lower extremities     once again Stressed the importance of wearing full foot insoles to help with the biomechanics affecting the knees and lower extremities; referral given to [facility] in [place].   once again Stressed the importance of lifestyle modifications including diet and exercise to promote weight loss due to impact on his lower extremities; for every 5 pounds lost is 25 pounds of pressure unloaded from his knees.   continue vitamin-D 2 -3000+ milligrams a day, as well as OTC symphytum as directed daily to promote bony healing, in addition to a daily multivitamin.     continue over-the-counter curcumin, turmeric, boswellia, as well as egg shell membrane as directed to aid with joint inflammation.     continue over-the-counter Move Free for joint health.    Patient advised regarding the risks and/or potential adverse reactions  and/or side effects of any prescribed medications along with any over-the-counter medications or any supplements used. Patient advised to seek immediate medical care if any adverse reactions occur. The patient and/or patient(s) parent(s) verbalized their understanding    Discussed in detail with the patient to the level of their understanding the possibility in the future of joint aspiration with reinjection of corticosteroid injection versus regenerative Prolozone/PRP/Prolozone injections versus insurance approved viscosupplementation injections    continue 6 millimeter heel lift to be placed in the LEFT shoe to accommodate for leg length discrepancy found on standing erect pelvis xray; in the future may recommend patient purchase custom orthotics with built-in 7.83 heel lift into the LEFT orthotic to fully accommodate leg length discrepancy.  Continue to follow-up with Vascular Medicine for venous stasis and to wear bilateral compression stockings as recommended; is scheduled for BLE venous US in the next few weeks.  Performed Prolozone injection #1 into the patients RIGHT knee under ultrasound. The patient tolerated the procedure well and without any adverse effects. Discharge instructions for REGENERATIVE PROLOZONE injections were reviewed in detail with the patient to the level of their understanding; a copy of these instructions were provided to the patient at the time of this office visit.  Follow-up next week for Supartz #1+ injection into the patients RIGHT knee or sooner as needed.    Please note that this report has been produced using speech recognition software.  It may contain errors related to grammar, punctuation or spelling.  Electronically signed, but not reviewed.  MARCELINO Ruano, Director of Sports Medicine    RUSTAM STORM on 7/31/24 at 9:13 AM.     INDIA Ruano DO    31-Aug-2017 21:32

## 2025-03-31 NOTE — PATIENT PROFILE ADULT. - URINARY CATHETER
no Quality 226: Preventive Care And Screening: Tobacco Use: Screening And Cessation Intervention: Patient screened for tobacco use and is an ex/non-smoker Detail Level: Generalized

## 2025-08-01 NOTE — ED PROVIDER NOTE - EYES, MLM
Instructions: right great toe, left great toe, right medial, right 3rd digit:  Cleanse with normal saline  Allow Vashe Wound Solution moistened gauze to sit on wound bed for 60 seconds  Apply hydrofera blue ready. Shiny side out.  Cover with ABD/gauze.  Secure with roll gauze.  Change 3 times weekly or as needed.     Doxycycline added to your antibiotics at today's visit. Please  from your pharmacy and start taking.  Increase protein to 100 grams daily. Suggest two 30 gram shakes daily.   Darco shoe and PEG insert for both feet to offload. May purchase on [employer] and bring to next appointment and get fitted in clinic.               Culture obtained of RGT at today's visit.  May purchase cast cover from [employer] or [employer] or [employer] for showering.     Home Health for wound assessment and dressing changes 3 times weekly.  
Clear bilaterally, pupils equal, round and reactive to light.